# Patient Record
Sex: MALE | Race: OTHER
[De-identification: names, ages, dates, MRNs, and addresses within clinical notes are randomized per-mention and may not be internally consistent; named-entity substitution may affect disease eponyms.]

---

## 2018-04-20 ENCOUNTER — HOSPITAL ENCOUNTER (OUTPATIENT)
Dept: HOSPITAL 89 - RAD | Age: 18
End: 2018-04-20
Attending: FAMILY MEDICINE
Payer: MEDICAID

## 2018-04-20 DIAGNOSIS — I69.991: Primary | ICD-10-CM

## 2018-04-20 PROCEDURE — 74230 X-RAY XM SWLNG FUNCJ C+: CPT
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
## 2018-04-25 ENCOUNTER — HOSPITAL ENCOUNTER (OUTPATIENT)
Dept: HOSPITAL 89 - ZZLCC | Age: 18
End: 2018-04-25
Attending: FAMILY MEDICINE
Payer: MEDICAID

## 2018-04-25 DIAGNOSIS — Z02.9: Primary | ICD-10-CM

## 2018-04-25 PROCEDURE — 82040 ASSAY OF SERUM ALBUMIN: CPT

## 2018-04-25 PROCEDURE — 84100 ASSAY OF PHOSPHORUS: CPT

## 2018-04-25 PROCEDURE — 83735 ASSAY OF MAGNESIUM: CPT

## 2018-06-12 ENCOUNTER — HOSPITAL ENCOUNTER (OUTPATIENT)
Dept: HOSPITAL 89 - ZZLCC | Age: 18
End: 2018-06-12
Attending: FAMILY MEDICINE
Payer: MEDICAID

## 2018-06-12 DIAGNOSIS — R11.10: Primary | ICD-10-CM

## 2018-06-12 LAB — PLATELET COUNT, AUTOMATED: 127 K/UL (ref 150–450)

## 2018-06-12 PROCEDURE — 85027 COMPLETE CBC AUTOMATED: CPT

## 2018-06-12 PROCEDURE — 82374 ASSAY BLOOD CARBON DIOXIDE: CPT

## 2018-06-12 PROCEDURE — 84520 ASSAY OF UREA NITROGEN: CPT

## 2018-06-12 PROCEDURE — 84295 ASSAY OF SERUM SODIUM: CPT

## 2018-06-12 PROCEDURE — 82565 ASSAY OF CREATININE: CPT

## 2018-06-12 PROCEDURE — 84132 ASSAY OF SERUM POTASSIUM: CPT

## 2018-06-12 PROCEDURE — 82310 ASSAY OF CALCIUM: CPT

## 2018-06-12 PROCEDURE — 82947 ASSAY GLUCOSE BLOOD QUANT: CPT

## 2018-06-12 PROCEDURE — 82435 ASSAY OF BLOOD CHLORIDE: CPT

## 2018-07-28 ENCOUNTER — HOSPITAL ENCOUNTER (EMERGENCY)
Dept: HOSPITAL 89 - ER | Age: 18
Discharge: HOME | End: 2018-07-28
Payer: MEDICAID

## 2018-07-28 ENCOUNTER — HOSPITAL ENCOUNTER (OUTPATIENT)
Dept: HOSPITAL 89 - AMB | Age: 18
End: 2018-07-28
Payer: MEDICAID

## 2018-07-28 VITALS — DIASTOLIC BLOOD PRESSURE: 54 MMHG | SYSTOLIC BLOOD PRESSURE: 98 MMHG

## 2018-07-28 DIAGNOSIS — Z93.1: Primary | ICD-10-CM

## 2018-07-28 DIAGNOSIS — R63.3: ICD-10-CM

## 2018-07-28 DIAGNOSIS — Z74.01: ICD-10-CM

## 2018-07-28 DIAGNOSIS — K94.23: Primary | ICD-10-CM

## 2018-07-28 PROCEDURE — 99283 EMERGENCY DEPT VISIT LOW MDM: CPT

## 2018-07-28 NOTE — ER REPORT
History and Physical


Time Seen By MD:  23:04


HPI/ROS


CHIEF COMPLAINT: feeding tube not working





HISTORY OF PRESENT ILLNESS: This is a 17 year old male. He had his Keppra given 

through the feeding tube, but  now not working. Sent by ambulance for further 

evaluation.


Home Meds


Reported Medications


Sertraline Hcl (SERTRALINE HCL) 25 Mg Tablet, 1 TAB FT QDAY, TAB


   7/29/18


Quetiapine Fumarate (QUETIAPINE FUMARATE) 25 Mg Tablet, 12.5 MG FT QDAY


   7/29/18


Quetiapine Fumarate (Quetiapine Fumarate ER) 50 Mg Tab.er.24h, 12.2 MG


   7/29/18


Potassium Chloride (POTASSIUM CHLORIDE) 20 Meq Packet, 20 MEQ FT QDAY, PACKET


   7/29/18


Oxycodone/Acetaminophen (OXYCODONE/ACETAMINOPHEN 5MG/325 MG) 5 Mg/325 Mg Tab, 1 

TAB FT Q8H


   7/29/18


Eyelid Cleanser Combination #5 (OCUSOFT LID SCRUB) 1 Each Med..pad, 1 EACH TP 

BID


   7/29/18


Hyoscyamine Sulfate (LEVSIN) 0.125 Mg Tablet, 0.125 MG FT BID


   7/29/18


Clonazepam (KLONOPIN) 1 Mg Tablet, 1 MG FT BID, #7 TAB


   7/29/18


Glycopyrrolate (GLYCOPYRROLATE) 2 Mg Tablet, 2 MG FT TID


   7/29/18


Levetiracetam (LEVETIRACETAM) 500 Mg Tablet, 500 MG FT BID


   7/28/18


Reviewed Nurses Notes:  Yes


Constitutional





Vital Sign - Last 24 Hours








 7/28/18





 23:06


 


Temp 99.1


 


Pulse 76


 


Resp 14


 


B/P (MAP) 98/54


 


Pulse Ox 95








Physical Exam


Abdomen is soft, non-tender. Feeding tube site normal in appearance. Feeding 

tube flushes easily with some CocaCola and then flushed with sterile water. No 

problems noted.





Medical Decision Making


ED Course/Re-evaluation


ED Course


feeding tube flushes normally. Okay to discharge back to care center.


Decision to Disposition Date:  Jul 28, 2018


Decision to Disposition Time:  23:10





Depart


Departure


Latest Vital Signs





Vital Signs








  Date Time  Temp Pulse Resp B/P (MAP) Pulse Ox O2 Delivery O2 Flow Rate FiO2


 


7/28/18 23:06 99.1 76 14 98/54 95   








Impression:  


 Primary Impression:  


 Feeding tube dysfunction


Condition:  Improved


Disposition:  HOME OR SELF-CARE





Additional Instructions:  


No changes at this time.





Problem Qualifiers








 Primary Impression:  


 Feeding tube dysfunction


 Encounter type:  initial encounter  Qualified Codes:  T85.598A - Other 

mechanical complication of other gastrointestinal prosthetic devices, implants 

and grafts, initial encounter








JACK THOMPSON MD Jul 28, 2018 23:05

## 2018-08-01 ENCOUNTER — HOSPITAL ENCOUNTER (OUTPATIENT)
Dept: HOSPITAL 89 - ZZLCC | Age: 18
End: 2018-08-01
Attending: FAMILY MEDICINE
Payer: MEDICAID

## 2018-08-01 DIAGNOSIS — S06.9X9D: Primary | ICD-10-CM

## 2018-08-01 DIAGNOSIS — R53.1: ICD-10-CM

## 2018-08-01 DIAGNOSIS — I63.9: ICD-10-CM

## 2018-08-01 LAB — PLATELET COUNT, AUTOMATED: 117 K/UL (ref 150–450)

## 2018-08-01 PROCEDURE — 82947 ASSAY GLUCOSE BLOOD QUANT: CPT

## 2018-08-01 PROCEDURE — 84460 ALANINE AMINO (ALT) (SGPT): CPT

## 2018-08-01 PROCEDURE — 84450 TRANSFERASE (AST) (SGOT): CPT

## 2018-08-01 PROCEDURE — 84075 ASSAY ALKALINE PHOSPHATASE: CPT

## 2018-08-01 PROCEDURE — 84132 ASSAY OF SERUM POTASSIUM: CPT

## 2018-08-01 PROCEDURE — 82040 ASSAY OF SERUM ALBUMIN: CPT

## 2018-08-01 PROCEDURE — 82435 ASSAY OF BLOOD CHLORIDE: CPT

## 2018-08-01 PROCEDURE — 84155 ASSAY OF PROTEIN SERUM: CPT

## 2018-08-01 PROCEDURE — 84295 ASSAY OF SERUM SODIUM: CPT

## 2018-08-01 PROCEDURE — 82310 ASSAY OF CALCIUM: CPT

## 2018-08-01 PROCEDURE — 82374 ASSAY BLOOD CARBON DIOXIDE: CPT

## 2018-08-01 PROCEDURE — 82565 ASSAY OF CREATININE: CPT

## 2018-08-01 PROCEDURE — 84520 ASSAY OF UREA NITROGEN: CPT

## 2018-08-01 PROCEDURE — 85027 COMPLETE CBC AUTOMATED: CPT

## 2018-08-01 PROCEDURE — 82247 BILIRUBIN TOTAL: CPT

## 2018-08-08 ENCOUNTER — HOSPITAL ENCOUNTER (EMERGENCY)
Dept: HOSPITAL 89 - ER | Age: 18
Discharge: HOME | End: 2018-08-08
Payer: MEDICAID

## 2018-08-08 ENCOUNTER — HOSPITAL ENCOUNTER (OUTPATIENT)
Dept: HOSPITAL 89 - AMB | Age: 18
End: 2018-08-08
Payer: MEDICAID

## 2018-08-08 VITALS — DIASTOLIC BLOOD PRESSURE: 42 MMHG | SYSTOLIC BLOOD PRESSURE: 89 MMHG

## 2018-08-08 DIAGNOSIS — R11.10: ICD-10-CM
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
## 2018-08-08 DIAGNOSIS — K94.23: Primary | ICD-10-CM

## 2018-08-08 DIAGNOSIS — Z74.01: ICD-10-CM

## 2018-08-08 DIAGNOSIS — R50.9: Primary | ICD-10-CM
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
## 2018-08-08 DIAGNOSIS — R53.1: Primary | ICD-10-CM

## 2018-08-08 DIAGNOSIS — S06.309A: ICD-10-CM

## 2018-08-08 LAB
INR PPP: 1.05
PLATELET COUNT, AUTOMATED: 118 K/UL (ref 150–450)

## 2018-08-08 PROCEDURE — 84132 ASSAY OF SERUM POTASSIUM: CPT

## 2018-08-08 PROCEDURE — 85025 COMPLETE CBC W/AUTO DIFF WBC: CPT

## 2018-08-08 PROCEDURE — 74340 X-RAY GUIDE FOR GI TUBE: CPT

## 2018-08-08 PROCEDURE — 96374 THER/PROPH/DIAG INJ IV PUSH: CPT

## 2018-08-08 PROCEDURE — 84460 ALANINE AMINO (ALT) (SGPT): CPT

## 2018-08-08 PROCEDURE — 82565 ASSAY OF CREATININE: CPT

## 2018-08-08 PROCEDURE — 85730 THROMBOPLASTIN TIME PARTIAL: CPT

## 2018-08-08 PROCEDURE — 99284 EMERGENCY DEPT VISIT MOD MDM: CPT

## 2018-08-08 PROCEDURE — 96361 HYDRATE IV INFUSION ADD-ON: CPT

## 2018-08-08 PROCEDURE — 82247 BILIRUBIN TOTAL: CPT

## 2018-08-08 PROCEDURE — 84075 ASSAY ALKALINE PHOSPHATASE: CPT

## 2018-08-08 PROCEDURE — 49465 FLUORO EXAM OF G/COLON TUBE: CPT

## 2018-08-08 PROCEDURE — 85610 PROTHROMBIN TIME: CPT

## 2018-08-08 PROCEDURE — 82947 ASSAY GLUCOSE BLOOD QUANT: CPT

## 2018-08-08 PROCEDURE — 82310 ASSAY OF CALCIUM: CPT

## 2018-08-08 PROCEDURE — 84155 ASSAY OF PROTEIN SERUM: CPT

## 2018-08-08 PROCEDURE — 82374 ASSAY BLOOD CARBON DIOXIDE: CPT

## 2018-08-08 PROCEDURE — 84520 ASSAY OF UREA NITROGEN: CPT

## 2018-08-08 PROCEDURE — 82040 ASSAY OF SERUM ALBUMIN: CPT

## 2018-08-08 PROCEDURE — 82435 ASSAY OF BLOOD CHLORIDE: CPT

## 2018-08-08 PROCEDURE — 84450 TRANSFERASE (AST) (SGOT): CPT

## 2018-08-08 PROCEDURE — 84295 ASSAY OF SERUM SODIUM: CPT

## 2018-08-08 PROCEDURE — 83690 ASSAY OF LIPASE: CPT
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
## 2018-08-08 NOTE — RADIOLOGY IMAGING REPORT
FACILITY: [facility] 

 

PATIENT NAME: Zelalem Salinas

: 2000

MR: [id number]

V: [id number]

EXAM DATE: 

ORDERING PHYSICIAN: GELY ALEXIS

TECHNOLOGIST: 

 

Location: [facility] [place]

Patient: Zelalem Salinas

: 2000

MRN: [id number]

Visit/Account:[id number]

Date of Sevice:  2018

 

ACCESSION #: [id number]

 

Exam type: FLUORO NG TUBE PLACEMENT

 

History: Gastrografin through G-tube for confirmation of placement

 

Comparison: None.

 

Findings:

 

60 mL of a Gastrografin suspension was instilled through the patient's gastrostomy tube gastrostomy t
ube with appears to be along the greater curvature of the gastric body.  The Gastrografin entered the
 stomach and passed into the duodenum without evidence of obstruction.  The balloon from the G-tube a
ppears to be tethering the greater curvature inferiorly.  The fluoroscopy dose area product was 147.7
2 micro-Gray per meter squared

 

IMPRESSION:

 

1.  The G-tube appears to be located along the greater curvature the stomach which is tethering the g
reater curvature inferiorly although there is no extraluminal extravasation of the Gastrografin which
 flowed freely into the stomach and proximal duodenum

 

Report Dictated By: Hanh Love MD at 2018 9:40 AM

 

Report E-Signed By: Hanh Love MD  at 2018 9:44 AM

 

WSN:[id number]

## 2018-08-29 ENCOUNTER — HOSPITAL ENCOUNTER (OUTPATIENT)
Dept: HOSPITAL 89 - AMB | Age: 18
End: 2018-08-29
Payer: MEDICAID

## 2018-08-29 ENCOUNTER — HOSPITAL ENCOUNTER (INPATIENT)
Dept: HOSPITAL 89 - ER | Age: 18
LOS: 3 days | Discharge: NURSING, REHAB OR LONG-TERM CARE | DRG: 177 | End: 2018-09-01
Attending: INTERNAL MEDICINE | Admitting: INTERNAL MEDICINE
Payer: MEDICAID

## 2018-08-29 VITALS — SYSTOLIC BLOOD PRESSURE: 101 MMHG | DIASTOLIC BLOOD PRESSURE: 55 MMHG

## 2018-08-29 VITALS — SYSTOLIC BLOOD PRESSURE: 95 MMHG | DIASTOLIC BLOOD PRESSURE: 52 MMHG

## 2018-08-29 VITALS — SYSTOLIC BLOOD PRESSURE: 97 MMHG | DIASTOLIC BLOOD PRESSURE: 67 MMHG

## 2018-08-29 DIAGNOSIS — R41.0: ICD-10-CM

## 2018-08-29 DIAGNOSIS — Z99.3: ICD-10-CM

## 2018-08-29 DIAGNOSIS — R11.10: Primary | ICD-10-CM

## 2018-08-29 DIAGNOSIS — G82.50: ICD-10-CM

## 2018-08-29 DIAGNOSIS — Z87.820: ICD-10-CM

## 2018-08-29 DIAGNOSIS — R09.02: ICD-10-CM

## 2018-08-29 DIAGNOSIS — K94.23: ICD-10-CM

## 2018-08-29 DIAGNOSIS — R00.0: ICD-10-CM

## 2018-08-29 DIAGNOSIS — J69.0: Primary | ICD-10-CM

## 2018-08-29 DIAGNOSIS — I95.9: ICD-10-CM

## 2018-08-29 LAB — PLATELET COUNT, AUTOMATED: 156 K/UL (ref 150–450)

## 2018-08-29 PROCEDURE — 82247 BILIRUBIN TOTAL: CPT

## 2018-08-29 PROCEDURE — 84460 ALANINE AMINO (ALT) (SGPT): CPT

## 2018-08-29 PROCEDURE — 71250 CT THORAX DX C-: CPT

## 2018-08-29 PROCEDURE — 82310 ASSAY OF CALCIUM: CPT

## 2018-08-29 PROCEDURE — 84520 ASSAY OF UREA NITROGEN: CPT

## 2018-08-29 PROCEDURE — 84295 ASSAY OF SERUM SODIUM: CPT

## 2018-08-29 PROCEDURE — 82565 ASSAY OF CREATININE: CPT

## 2018-08-29 PROCEDURE — 87040 BLOOD CULTURE FOR BACTERIA: CPT

## 2018-08-29 PROCEDURE — 74176 CT ABD & PELVIS W/O CONTRAST: CPT

## 2018-08-29 PROCEDURE — 82374 ASSAY BLOOD CARBON DIOXIDE: CPT

## 2018-08-29 PROCEDURE — 84155 ASSAY OF PROTEIN SERUM: CPT

## 2018-08-29 PROCEDURE — 99284 EMERGENCY DEPT VISIT MOD MDM: CPT

## 2018-08-29 PROCEDURE — 82947 ASSAY GLUCOSE BLOOD QUANT: CPT

## 2018-08-29 PROCEDURE — 82040 ASSAY OF SERUM ALBUMIN: CPT

## 2018-08-29 PROCEDURE — 36415 COLL VENOUS BLD VENIPUNCTURE: CPT

## 2018-08-29 PROCEDURE — 84075 ASSAY ALKALINE PHOSPHATASE: CPT

## 2018-08-29 PROCEDURE — 43760: CPT

## 2018-08-29 PROCEDURE — 96361 HYDRATE IV INFUSION ADD-ON: CPT

## 2018-08-29 PROCEDURE — 84132 ASSAY OF SERUM POTASSIUM: CPT

## 2018-08-29 PROCEDURE — 96365 THER/PROPH/DIAG IV INF INIT: CPT

## 2018-08-29 PROCEDURE — 85025 COMPLETE CBC W/AUTO DIFF WBC: CPT

## 2018-08-29 PROCEDURE — 84450 TRANSFERASE (AST) (SGOT): CPT

## 2018-08-29 PROCEDURE — 0D20XUZ CHANGE FEEDING DEVICE IN UPPER INTESTINAL TRACT, EXTERNAL APPROACH: ICD-10-PCS | Performed by: SURGERY

## 2018-08-29 PROCEDURE — 82435 ASSAY OF BLOOD CHLORIDE: CPT

## 2018-08-29 RX ADMIN — AMPICILLIN SODIUM AND SULBACTAM SODIUM SCH MLS/HR: 2; 1 INJECTION, POWDER, FOR SOLUTION INTRAVENOUS at 20:18

## 2018-08-29 RX ADMIN — LEVETIRACETAM SCH MLS/HR: 100 INJECTION, SOLUTION INTRAVENOUS at 21:29

## 2018-08-29 RX ADMIN — AMPICILLIN SODIUM AND SULBACTAM SODIUM SCH MLS/HR: 2; 1 INJECTION, POWDER, FOR SOLUTION INTRAVENOUS at 14:02

## 2018-08-29 RX ADMIN — THIAMINE HYDROCHLORIDE PRN MLS/HR: 100 INJECTION, SOLUTION INTRAMUSCULAR; INTRAVENOUS at 23:54

## 2018-08-29 NOTE — RADIOLOGY IMAGING REPORT
FACILITY: [facility] [place] 

 

PATIENT NAME: Zelalem Salinas

: 2000

MR: [id number]

V: [id number]

EXAM DATE: [id number]

ORDERING PHYSICIAN: GELY ALEXIS

TECHNOLOGIST: 

 

[place] [facility] [place]

Patient: Zelalem Salinas

: 2000

MRN: [id number]

Visit/Account:[id number]

Date of Sevice:  2018

 

ACCESSION #: [id number]

 

ABDOMEN/PELVIS W/O CONTRAST

 

HISTORY:  Abdominal pain.  Evaluate for gastric outlet obstruction

 

TECHNIQUE:  Axial images were obtained through the abdomen and pelvis without intravenous contrast . 
 Dilute Gastrografin administered prior to the exam. One of the following dose optimization technique
s was utilized in the performance of this exam: automated exposure control; adjustment of the mA and/
or kv according to patient size; or use of iterative reconstruction technique. Specific details can b
e referenced in the facility's radiology CT exam operational policy.

 

CONTRAST:  None

 

COMPARISON:  None.

 

FINDINGS:

 

Visualized lung bases:  Bilateral perihilar groundglass-reticular opacities.

Hepatobiliary:  Negative.

Spleen:  Negative.

Adrenals:  Negative.

Pancreas:  Negative.

Kidneys/ureters/bladder:  Negative.

Bowel/peritoneum/mesentery:  Percutaneous gastrostomy tube.  Dilute Gastrografin fills the stomach wi
th a small amount of contrast seen within the duodenum and proximal jejunum.  No evidence of small travis
wel obstruction.  No free air.  No contrast extravasation.  Large amount of stool within the rectosig
moid colon with a rectal stool ball measuring 9.7 x 9.1 x 8.8 cm.

Vessels:  Negative.

Lymph nodes: Negative.

Pelvic genitourinary: Negative.

Bones/body wall:  Negative.

Other findings: None significant

 

IMPRESSION:

 

1.  Dilute Gastrografin within the stomach with a small amount of contrast seen within duodenum and j
ejunum.  No free air or contrast extravasation.

2.  Large amount of stool within the rectosigmoid colon without obstructive features.

3.  Moderate bilateral perihilar groundglass-reticular opacities which may be infectious/inflammatory
 or aspiration.

 

Report Dictated By: Joel Camacho MD at 2018 10:44 AM

 

Report E-Signed By: Joel Camacho MD  at 2018 10:51 AM

 

WSN:[id number]
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
## 2018-08-29 NOTE — RADIOLOGY IMAGING REPORT
FACILITY: [facility] [place] 

 

PATIENT NAME: Zelalem Salinas

: 2000

MR: [id number]

V: [id number]

EXAM DATE: 

ORDERING PHYSICIAN: GELY ALEXIS

TECHNOLOGIST: 

 

Location: [facility] [place]

Patient: Zelalem Salinas

: 2000

MRN: [id number]

Visit/Account:[id number]

Date of Sevice:  2018

 

ACCESSION #: [id number]

 

CHEST W/O CONTRAST

 

HISTORY:  aspiration

 

TECHNIQUE:  CT chest without intravenous contrast.

 

One of the following dose optimization techniques was utilized in the performance of this exam: Autom
ated exposure control; adjustment of the mA and/or kV according to the patient's size; or use of an i
terative  reconstruction technique.  Specific details can be referenced in the facility's radiology C
T exam operational policy.

 

CONTRAST:  None.

 

COMPARISON:  None.

 

FINDINGS:

 

Heart/vessels:  Note is made of apparent common carotid bypass grafts which are not well assessed wit
hout IV contrast. Otherwise negative.

 

Mediastinum:  Negative.

 

Lymph nodes:  Borderline prominent nonspecific right paratracheal lymph node measuring up to 9 mm in 
short axis. No pathologically enlarged.

 

Lungs/pleura:  Extensive nodular groundglass opacities throughout the lungs, right greater than left.
 There is a small right tracheal diverticulum.. Cannot accurately assess for small pulmonary micronod
ules secondary to respiratory motion artifact.

 

Visualized upper abdomen:  Negative.

 

Bones/soft tissues:  Negative.

 

IMPRESSION:

 

1. Extensive nodular groundglass opacities throughout the lungs, right greater than left, likely infe
ctious/inflammatory in etiology.

2. Additional incidental/chronic findings, as above.

 

Report Dictated By: Kimo Chance MD at 2018 11:31 AM

 

Report E-Signed By: Kimo Chance MD  at 2018 11:35 AM

 

WSN:[id number]
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
## 2018-08-30 VITALS — DIASTOLIC BLOOD PRESSURE: 52 MMHG | SYSTOLIC BLOOD PRESSURE: 111 MMHG

## 2018-08-30 VITALS — DIASTOLIC BLOOD PRESSURE: 45 MMHG | SYSTOLIC BLOOD PRESSURE: 83 MMHG

## 2018-08-30 VITALS — DIASTOLIC BLOOD PRESSURE: 66 MMHG | SYSTOLIC BLOOD PRESSURE: 103 MMHG

## 2018-08-30 VITALS — SYSTOLIC BLOOD PRESSURE: 97 MMHG | DIASTOLIC BLOOD PRESSURE: 53 MMHG

## 2018-08-30 LAB — PLATELET COUNT, AUTOMATED: 104 K/UL (ref 150–450)

## 2018-08-30 RX ADMIN — HYOSCYAMINE SULFATE SCH MG: 0.12 SOLUTION/ DROPS ORAL at 21:24

## 2018-08-30 RX ADMIN — AMPICILLIN SODIUM AND SULBACTAM SODIUM SCH MLS/HR: 2; 1 INJECTION, POWDER, FOR SOLUTION INTRAVENOUS at 02:21

## 2018-08-30 RX ADMIN — SERTRALINE HYDROCHLORIDE SCH MG: 50 TABLET, FILM COATED ORAL at 12:53

## 2018-08-30 RX ADMIN — BACLOFEN SCH MG: 10 TABLET ORAL at 21:23

## 2018-08-30 RX ADMIN — BACLOFEN SCH MG: 10 TABLET ORAL at 16:34

## 2018-08-30 RX ADMIN — POLYETHYLENE GLYCOL 3350 SCH GM: 17 POWDER, FOR SOLUTION ORAL at 12:53

## 2018-08-30 RX ADMIN — BACLOFEN SCH MG: 10 TABLET ORAL at 12:53

## 2018-08-30 RX ADMIN — AMPICILLIN SODIUM AND SULBACTAM SODIUM SCH MLS/HR: 2; 1 INJECTION, POWDER, FOR SOLUTION INTRAVENOUS at 21:21

## 2018-08-30 RX ADMIN — THIAMINE HYDROCHLORIDE PRN MLS/HR: 100 INJECTION, SOLUTION INTRAMUSCULAR; INTRAVENOUS at 16:38

## 2018-08-30 RX ADMIN — AMPICILLIN SODIUM AND SULBACTAM SODIUM SCH MLS/HR: 2; 1 INJECTION, POWDER, FOR SOLUTION INTRAVENOUS at 08:04

## 2018-08-30 RX ADMIN — LEVETIRACETAM SCH MLS/HR: 100 INJECTION, SOLUTION INTRAVENOUS at 09:25

## 2018-08-30 RX ADMIN — HYOSCYAMINE SULFATE SCH MG: 0.12 SOLUTION/ DROPS ORAL at 12:55

## 2018-08-30 RX ADMIN — THIAMINE HYDROCHLORIDE PRN MLS/HR: 100 INJECTION, SOLUTION INTRAMUSCULAR; INTRAVENOUS at 10:53

## 2018-08-30 RX ADMIN — AMPICILLIN SODIUM AND SULBACTAM SODIUM SCH MLS/HR: 2; 1 INJECTION, POWDER, FOR SOLUTION INTRAVENOUS at 14:12

## 2018-08-30 RX ADMIN — QUETIAPINE FUMARATE SCH MG: 25 TABLET, FILM COATED ORAL at 21:23
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
## 2018-08-31 VITALS — DIASTOLIC BLOOD PRESSURE: 52 MMHG | SYSTOLIC BLOOD PRESSURE: 85 MMHG

## 2018-08-31 VITALS — DIASTOLIC BLOOD PRESSURE: 72 MMHG | SYSTOLIC BLOOD PRESSURE: 106 MMHG

## 2018-08-31 VITALS — SYSTOLIC BLOOD PRESSURE: 91 MMHG | DIASTOLIC BLOOD PRESSURE: 50 MMHG

## 2018-08-31 VITALS — DIASTOLIC BLOOD PRESSURE: 78 MMHG | SYSTOLIC BLOOD PRESSURE: 110 MMHG

## 2018-08-31 RX ADMIN — BACLOFEN SCH MG: 10 TABLET ORAL at 13:33

## 2018-08-31 RX ADMIN — AMPICILLIN SODIUM AND SULBACTAM SODIUM SCH MLS/HR: 2; 1 INJECTION, POWDER, FOR SOLUTION INTRAVENOUS at 08:26

## 2018-08-31 RX ADMIN — SERTRALINE HYDROCHLORIDE SCH MG: 50 TABLET, FILM COATED ORAL at 09:25

## 2018-08-31 RX ADMIN — AMPICILLIN SODIUM AND SULBACTAM SODIUM SCH MLS/HR: 2; 1 INJECTION, POWDER, FOR SOLUTION INTRAVENOUS at 01:43

## 2018-08-31 RX ADMIN — QUETIAPINE FUMARATE SCH MG: 25 TABLET, FILM COATED ORAL at 20:40

## 2018-08-31 RX ADMIN — BACLOFEN SCH MG: 10 TABLET ORAL at 09:25

## 2018-08-31 RX ADMIN — ENOXAPARIN SODIUM SCH MG: 100 INJECTION SUBCUTANEOUS at 14:13

## 2018-08-31 RX ADMIN — HYOSCYAMINE SULFATE SCH MG: 0.12 SOLUTION/ DROPS ORAL at 09:26

## 2018-08-31 RX ADMIN — THIAMINE HYDROCHLORIDE PRN MLS/HR: 100 INJECTION, SOLUTION INTRAMUSCULAR; INTRAVENOUS at 01:25

## 2018-08-31 RX ADMIN — AMPICILLIN SODIUM AND SULBACTAM SODIUM SCH MLS/HR: 2; 1 INJECTION, POWDER, FOR SOLUTION INTRAVENOUS at 20:39

## 2018-08-31 RX ADMIN — MORPHINE SULFATE PRN MG: 2 INJECTION, SOLUTION INTRAMUSCULAR; INTRAVENOUS at 22:51

## 2018-08-31 RX ADMIN — MORPHINE SULFATE PRN MG: 2 INJECTION, SOLUTION INTRAMUSCULAR; INTRAVENOUS at 15:54

## 2018-08-31 RX ADMIN — THIAMINE HYDROCHLORIDE PRN MLS/HR: 100 INJECTION, SOLUTION INTRAMUSCULAR; INTRAVENOUS at 10:54

## 2018-08-31 RX ADMIN — BACLOFEN SCH MG: 10 TABLET ORAL at 20:39

## 2018-08-31 RX ADMIN — POLYETHYLENE GLYCOL 3350 SCH GM: 17 POWDER, FOR SOLUTION ORAL at 09:26

## 2018-08-31 RX ADMIN — MORPHINE SULFATE PRN MG: 2 INJECTION, SOLUTION INTRAMUSCULAR; INTRAVENOUS at 18:07

## 2018-08-31 RX ADMIN — AMPICILLIN SODIUM AND SULBACTAM SODIUM SCH MLS/HR: 2; 1 INJECTION, POWDER, FOR SOLUTION INTRAVENOUS at 13:34

## 2018-08-31 RX ADMIN — HYOSCYAMINE SULFATE SCH MG: 0.12 SOLUTION/ DROPS ORAL at 20:40

## 2018-08-31 RX ADMIN — BACLOFEN SCH MG: 10 TABLET ORAL at 17:35

## 2018-08-31 RX ADMIN — ACETAMINOPHEN PRN MG: 160 SOLUTION ORAL at 14:24
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
## 2018-08-31 NOTE — MISCELLANEOUS PROVIDER NOTE
Miscellaneous Provider Note


Note


Patient's feeding tube is occluded and cannot be used for feedings or even 


flushed. I swapped it out and placed a 20 [place] balloon tip feeding tube. He 


tolerated without problems. Tube feedings can be resumed at hospitalist 


discretion.











ULLRICH,JOHN A MD              Aug 31, 2018 09:03

## 2018-09-01 VITALS — DIASTOLIC BLOOD PRESSURE: 86 MMHG | SYSTOLIC BLOOD PRESSURE: 119 MMHG

## 2018-09-01 LAB — PLATELET COUNT, AUTOMATED: 107 K/UL (ref 150–450)

## 2018-09-01 RX ADMIN — HYOSCYAMINE SULFATE SCH MG: 0.12 SOLUTION/ DROPS ORAL at 09:44

## 2018-09-01 RX ADMIN — MORPHINE SULFATE PRN MG: 2 INJECTION, SOLUTION INTRAMUSCULAR; INTRAVENOUS at 06:24

## 2018-09-01 RX ADMIN — ENOXAPARIN SODIUM SCH MG: 100 INJECTION SUBCUTANEOUS at 09:43

## 2018-09-01 RX ADMIN — AMPICILLIN SODIUM AND SULBACTAM SODIUM SCH MLS/HR: 2; 1 INJECTION, POWDER, FOR SOLUTION INTRAVENOUS at 02:51

## 2018-09-01 RX ADMIN — SERTRALINE HYDROCHLORIDE SCH MG: 50 TABLET, FILM COATED ORAL at 09:43

## 2018-09-01 RX ADMIN — ACETAMINOPHEN PRN MG: 160 SOLUTION ORAL at 07:46

## 2018-09-01 RX ADMIN — POLYETHYLENE GLYCOL 3350 SCH GM: 17 POWDER, FOR SOLUTION ORAL at 09:43

## 2018-09-01 RX ADMIN — AMPICILLIN SODIUM AND SULBACTAM SODIUM SCH MLS/HR: 2; 1 INJECTION, POWDER, FOR SOLUTION INTRAVENOUS at 07:46

## 2018-09-01 RX ADMIN — BACLOFEN SCH MG: 10 TABLET ORAL at 09:43

## 2018-09-01 RX ADMIN — ACETAMINOPHEN PRN MG: 160 SOLUTION ORAL at 00:14

## 2018-09-01 RX ADMIN — AMPICILLIN SODIUM AND SULBACTAM SODIUM SCH MLS/HR: 2; 1 INJECTION, POWDER, FOR SOLUTION INTRAVENOUS at 13:19

## 2018-09-01 RX ADMIN — BACLOFEN SCH MG: 10 TABLET ORAL at 12:47

## 2018-09-01 RX ADMIN — THIAMINE HYDROCHLORIDE PRN MLS/HR: 100 INJECTION, SOLUTION INTRAMUSCULAR; INTRAVENOUS at 02:53
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
## 2018-09-28 ENCOUNTER — HOSPITAL ENCOUNTER (INPATIENT)
Dept: HOSPITAL 89 - ER | Age: 18
LOS: 3 days | Discharge: NURSING, REHAB OR LONG-TERM CARE | DRG: 177 | End: 2018-10-01
Attending: INTERNAL MEDICINE | Admitting: INTERNAL MEDICINE
Payer: MEDICAID

## 2018-09-28 ENCOUNTER — HOSPITAL ENCOUNTER (OUTPATIENT)
Dept: HOSPITAL 89 - AMB | Age: 18
End: 2018-09-28
Payer: MEDICAID

## 2018-09-28 VITALS — DIASTOLIC BLOOD PRESSURE: 63 MMHG | SYSTOLIC BLOOD PRESSURE: 105 MMHG

## 2018-09-28 VITALS — SYSTOLIC BLOOD PRESSURE: 105 MMHG | DIASTOLIC BLOOD PRESSURE: 63 MMHG

## 2018-09-28 DIAGNOSIS — R11.12: Primary | ICD-10-CM

## 2018-09-28 DIAGNOSIS — R53.81: ICD-10-CM

## 2018-09-28 DIAGNOSIS — G82.20: ICD-10-CM

## 2018-09-28 DIAGNOSIS — N39.0: ICD-10-CM

## 2018-09-28 DIAGNOSIS — G82.50: ICD-10-CM

## 2018-09-28 DIAGNOSIS — Z74.01: ICD-10-CM

## 2018-09-28 DIAGNOSIS — K59.00: ICD-10-CM

## 2018-09-28 DIAGNOSIS — Z66: ICD-10-CM

## 2018-09-28 DIAGNOSIS — Z87.820: ICD-10-CM

## 2018-09-28 DIAGNOSIS — B95.2: ICD-10-CM

## 2018-09-28 DIAGNOSIS — J69.0: Primary | ICD-10-CM

## 2018-09-28 LAB — PLATELET COUNT, AUTOMATED: 116 K/UL (ref 150–450)

## 2018-09-28 PROCEDURE — 82374 ASSAY BLOOD CARBON DIOXIDE: CPT

## 2018-09-28 PROCEDURE — 82150 ASSAY OF AMYLASE: CPT

## 2018-09-28 PROCEDURE — 96365 THER/PROPH/DIAG IV INF INIT: CPT

## 2018-09-28 PROCEDURE — 71045 X-RAY EXAM CHEST 1 VIEW: CPT

## 2018-09-28 PROCEDURE — 87088 URINE BACTERIA CULTURE: CPT

## 2018-09-28 PROCEDURE — 84075 ASSAY ALKALINE PHOSPHATASE: CPT

## 2018-09-28 PROCEDURE — 84460 ALANINE AMINO (ALT) (SGPT): CPT

## 2018-09-28 PROCEDURE — 96361 HYDRATE IV INFUSION ADD-ON: CPT

## 2018-09-28 PROCEDURE — 84132 ASSAY OF SERUM POTASSIUM: CPT

## 2018-09-28 PROCEDURE — 84520 ASSAY OF UREA NITROGEN: CPT

## 2018-09-28 PROCEDURE — 82947 ASSAY GLUCOSE BLOOD QUANT: CPT

## 2018-09-28 PROCEDURE — 74177 CT ABD & PELVIS W/CONTRAST: CPT

## 2018-09-28 PROCEDURE — 87077 CULTURE AEROBIC IDENTIFY: CPT

## 2018-09-28 PROCEDURE — 82247 BILIRUBIN TOTAL: CPT

## 2018-09-28 PROCEDURE — 87186 SC STD MICRODIL/AGAR DIL: CPT

## 2018-09-28 PROCEDURE — 87040 BLOOD CULTURE FOR BACTERIA: CPT

## 2018-09-28 PROCEDURE — 82435 ASSAY OF BLOOD CHLORIDE: CPT

## 2018-09-28 PROCEDURE — 87502 INFLUENZA DNA AMP PROBE: CPT

## 2018-09-28 PROCEDURE — 82040 ASSAY OF SERUM ALBUMIN: CPT

## 2018-09-28 PROCEDURE — 82565 ASSAY OF CREATININE: CPT

## 2018-09-28 PROCEDURE — 85025 COMPLETE CBC W/AUTO DIFF WBC: CPT

## 2018-09-28 PROCEDURE — 81001 URINALYSIS AUTO W/SCOPE: CPT

## 2018-09-28 PROCEDURE — 36415 COLL VENOUS BLD VENIPUNCTURE: CPT

## 2018-09-28 PROCEDURE — 84155 ASSAY OF PROTEIN SERUM: CPT

## 2018-09-28 PROCEDURE — 84295 ASSAY OF SERUM SODIUM: CPT

## 2018-09-28 PROCEDURE — 83690 ASSAY OF LIPASE: CPT

## 2018-09-28 PROCEDURE — 99284 EMERGENCY DEPT VISIT MOD MDM: CPT

## 2018-09-28 PROCEDURE — 84450 TRANSFERASE (AST) (SGOT): CPT

## 2018-09-28 PROCEDURE — 82310 ASSAY OF CALCIUM: CPT

## 2018-09-28 NOTE — RADIOLOGY IMAGING REPORT
[place] [facility] [place] 

 

PATIENT NAME: Zelalem Salinas

: 2000

MR: [id number]

V: [id number]

EXAM DATE: 

ORDERING PHYSICIAN: DESIRE AYERS

TECHNOLOGIST: 

 

Location: [facility] - [place]

Patient: Zelalem Salinas

: 2000

MRN: [id number]

Visit/Account:[id number]

Date of Sevice:  2018

 

ACCESSION #: [id number]

 

EXAMINATION: Portable AP Chest

 

HISTORY: Vomiting. Nausea.

 

COMPARISON: CT chest 2018.

 

FINDINGS:

There are increased perihilar interstitial markings bilaterally with peribronchial thickening. There 
is some mild patchy airspace disease in the mid and lower lungs. Allowing for differences in modality
 this is likely improved from the prior chest CT. Mild patchy parenchymal opacities are also noted al
raji the imaged lower lungs on the abdominal CT performed today.

 

No confluent consolidation. No pleural effusion or pneumothorax.

 

Normal cardiomediastinal silhouette. Sternotomy. Surgical clips in the neck and upper chest.

 

No acute osseous findings.

 

IMPRESSION:

Interstitial prominence throughout both lungs. Mild patchy airspace opacity in the lower lungs may re
present multifocal pneumonitis.

 

Report Dictated By: Deacon Wells MD at 2018 8:50 PM

 

Report E-Signed By: Deacon Wells MD  at 2018 8:53 PM

 

WSN:[id number]

## 2018-09-28 NOTE — ER REPORT
History and Physical


Time Seen By MD:  17:43


Hx. of Stated Complaint:  


vomiting 12x today, feverish, pt is nonverbal (baseline) and paralyzed


HPI/ROS


CHIEF COMPLAINT: Vomiting 12, cough





HISTORY OF PRESENT ILLNESS: 18-year-old male patient persisted emergency room 


with complaint of vomiting 12, cough. Patient is a resident of [facility]. He had several bouts of vomiting this morning before lunch. He had about


2 hours after lunch where he did not have any vomiting. After that he did have 


several more bouts of vomiting. They did note that he did have a cough couple of


times. They state that due to the vomiting they tried to get hold of his primary


care provider, however they were unable to and referred him to the emergency 


room for further evaluation. I did give him some Reglan which seemed to help for


short. Time.





REVIEW OF SYSTEMS:


Respiratory: As noted above


Cardiovascular: No chest pain, no palpitations.


Gastrointestinal: As noted above


Musculoskeletal: No back pain.


Allergies:  


Coded Allergies:  


     No Known Drug Allergies (Unverified , 8/8/18)


Home Meds


Reported Medications


Metoclopramide Hcl (METOCLOPRAMIDE HCL) 5 Mg Tablet, 5 MG PO


   9/28/18


Ondansetron (ZOFRAN ODT) 4 Mg Tab.rapdis, 4 MG PO Q8H PRN for NAUSEA, TAB.SOL


   8/30/18


Baclofen (BACLOFEN) 20 Mg Tablet, 20 MG FT QID, #15 TAB


   8/29/18


Polyethylene Glycol 3350 (MIRALAX) 17 Gm Powd.pack, 17 GM FT DAILY, PKT


   8/29/18


Bisacodyl (BISACODYL) 10 Mg Supp.rect, 10 MG RC DAILY, SUPP.RECT


   8/29/18


Sertraline Hcl (SERTRALINE HCL) 25 Mg Tablet, 1 TAB FT QDAY, TAB


   7/29/18


Quetiapine Fumarate (QUETIAPINE FUMARATE) 25 Mg Tablet, 12.5 MG FT QDAY


   7/29/18


Potassium Chloride (POTASSIUM CHLORIDE) 20 Meq Packet, 20 MEQ FT QDAY, PACKET


   7/29/18


Oxycodone/Acetaminophen (OXYCODONE/ACETAMINOPHEN 5MG/325 MG) 5 Mg/325 Mg Tab, 1 


TAB FT Q8H


   7/29/18


Eyelid Cleanser Combination #5 (OCUSOFT LID SCRUB) 1 Each Med..pad, 1 EACH TP 


BID


   7/29/18


Hyoscyamine Sulfate (LEVSIN) 0.125 Mg Tablet, 0.125 MG FT BID


   7/29/18


Clonazepam (KLONOPIN) 1 Mg Tablet, 1 MG FT BID, #7 TAB


   7/29/18


Glycopyrrolate (GLYCOPYRROLATE) 2 Mg Tablet, 2 MG FT TID


   7/29/18


Levetiracetam (LEVETIRACETAM) 500 Mg Tablet, 500 MG FT BID


   7/28/18


Discontinued Reported Medications


Dantrolene Sodium (DANTROLENE SODIUM) 25 Mg Capsule, 25 MG PO BID, CAPSULE


   8/29/18


Discontinued Scripts


Amoxicillin/Pot Clav 875-125 Mg Tab (AUGMENTIN 875-125 TABLET) 1 Each Tablet, 1 


TAB FT Q12H, #6 TAB


   Prov:ANI LAURA DO         9/1/18


Past Medical/Surgical History


Patient has a past medical history of TBI, contractures, quadriplegia.


Patient has a surgical history of PEG tube.


Reviewed Nurses Notes:  Yes


Hx Smoking:  No


Hx Substance Use Disorder:  No


Hx Alcohol Use:  No


Constitutional





Vital Sign - Last 24 Hours








 9/28/18 9/28/18





 17:29 18:05


 


Temp 101.3 


 


Pulse 100 


 


Resp 20 


 


B/P (MAP) 107/64 


 


Pulse Ox 86 


 


O2 Delivery Room Air 


 


O2 Flow Rate  3.0








Physical Exam


  General Appearance: The patient is alert, has no immediate need for airway 


protection and no current signs of toxicity.


Respiratory: Chest is non tender, lungs are clear to auscultation.


Cardiac: regular rate and rhythm


Gastrointestinal: Abdomen is distended and non tender, no masses, bowel sounds 


normal.


Musculoskeletal:  Neck: Neck is supple and non tender.


   Extremities have full range of motion and are non tender.


Skin: No rashes or lesions.





DIFFERENTIAL DIAGNOSIS: After history and physical exam differential diagnosis 


was considered for nausea and vomiting including but not limited to 


gastroenteritis, gastritis, appendicitis, and medication side effect.





Medical Decision Making


Data Points


Result Diagram:  


9/28/18 1804                                                                    


           9/28/18 1804





Laboratory





Hematology








Test


 9/28/18


17:36 9/28/18


18:04 9/28/18


18:51


 


Influenza Virus Type A (PCR)


 Negative


(NEGATIVE) 


 





 


Influenza Virus Type B (PCR)


 Negative


(NEGATIVE) 


 





 


Red Blood Count


 


 6.00 M/uL


(4.00-5.60) 





 


Mean Corpuscular Volume


 


 88.3 fL


(80.0-96.0) 





 


Mean Corpuscular Hemoglobin


 


 30.1 pg


(26.0-33.0) 





 


Mean Corpuscular Hemoglobin


Concent 


 34.0 g/dL


(32.0-36.0) 





 


Red Cell Distribution Width


 


 14.0 %


(11.5-14.5) 





 


Mean Platelet Volume


 


 11.6 fL


(7.2-11.1) 





 


Neutrophils (%) (Auto)


 


 91.5 %


(39.4-72.5) 





 


Lymphocytes (%) (Auto)


 


 2.2 %


(17.6-49.6) 





 


Monocytes (%) (Auto)


 


 6.0 %


(4.1-12.4) 





 


Eosinophils (%) (Auto)


 


 0.2 %


(0.4-6.7) 





 


Basophils (%) (Auto)


 


 0.1 %


(0.3-1.4) 





 


Nucleated RBC Relative Count


(auto) 


 0.0 /100WBC 


 





 


Neutrophils # (Auto)


 


 15.9 K/uL


(2.0-7.4) 





 


Lymphocytes # (Auto)


 


 0.4 K/uL


(1.3-3.6) 





 


Monocytes # (Auto)


 


 1.0 K/uL


(0.3-1.0) 





 


Eosinophils # (Auto)


 


 0.0 K/uL


(0.0-0.5) 





 


Basophils # (Auto)


 


 0.0 K/uL


(0.0-0.1) 





 


Nucleated RBC Absolute Count


(auto) 


 0.00 K/uL 


 





 


Sodium Level


 


 139 mmol/L


(137-145) 





 


Potassium Level


 


 3.9 mmol/L


(3.5-5.0) 





 


Chloride Level


 


 96 mmol/L


() 





 


Carbon Dioxide Level


 


 27 mmol/L


(22-30) 





 


Blood Urea Nitrogen


 


 18 mg/dl


(9-21) 





 


Creatinine


 


 0.90 mg/dl


(0.66-1.25) 





 


Glomerular Filtration Rate


Calc 


 > 60.0 


 





 


Random Glucose


 


 140 mg/dl


() 





 


Calcium Level


 


 9.5 mg/dl


(8.4-10.2) 





 


Total Bilirubin


 


 1.1 mg/dl


(0.2-1.3) 





 


Aspartate Amino Transf


(AST/SGOT) 


 54 U/L (0-35) 


 





 


Alanine Aminotransferase


(ALT/SGPT) 


 142 U/L (0-56) 


 





 


Alkaline Phosphatase


 


 149 U/L


(0-126) 





 


Total Protein


 


 8.3 g/dl


(6.3-8.2) 





 


Albumin


 


 4.5 g/dl


(3.5-5.0) 





 


Amylase Level  73 U/L (0-110)  


 


Lipase


 


 19 U/L


() 





 


Urine Color   Priscilla 


 


Urine Clarity


 


 


 Slightly-cloudy





 


Urine pH


 


 


 6.0 pH


(4.8-9.5)


 


Urine Specific Gravity   1.023 


 


Urine Protein


 


 


 Negative mg/dL


(NEGATIVE)


 


Urine Glucose (UA)


 


 


 Negative mg/dL


(NEGATIVE)


 


Urine Ketones


 


 


 Negative mg/dL


(NEGATIVE)


 


Urine Blood


 


 


 Negative


(NEGATIVE)


 


Urine Nitrite


 


 


 Negative


(NEGATIVE)


 


Urine Bilirubin


 


 


 Negative


(NEGATIVE)


 


Urine Urobilinogen


 


 


 2.0 mg/dL


(0.2-1.9)


 


Urine Leukocyte Esterase


 


 


 Negative


(NEGATIVE)


 


Urine RBC


 


 


 <1 /HPF


(0-2/HPF)


 


Urine WBC


 


 


 3 /HPF


(0-5/HPF)


 


Urine Squamous Epithelial


Cells 


 


 None /LPF


(NONE-FEW)


 


Urine Bacteria


 


 


 Negative /HPF


(NONE-FEW)


 


Urine Mucus


 


 


 Few /HPF


(NONE-FEW)








Chemistry








Test


 9/28/18


17:36 9/28/18


18:04 9/28/18


18:51


 


Influenza Virus Type A (PCR)


 Negative


(NEGATIVE) 


 





 


Influenza Virus Type B (PCR)


 Negative


(NEGATIVE) 


 





 


White Blood Count


 


 17.3 k/uL


(4.5-11.0) 





 


Red Blood Count


 


 6.00 M/uL


(4.00-5.60) 





 


Hemoglobin


 


 18.0 g/dL


(14.0-18.0) 





 


Hematocrit


 


 53.0 %


(42.0-52.0) 





 


Mean Corpuscular Volume


 


 88.3 fL


(80.0-96.0) 





 


Mean Corpuscular Hemoglobin


 


 30.1 pg


(26.0-33.0) 





 


Mean Corpuscular Hemoglobin


Concent 


 34.0 g/dL


(32.0-36.0) 





 


Red Cell Distribution Width


 


 14.0 %


(11.5-14.5) 





 


Platelet Count


 


 116 K/uL


(150-450) 





 


Mean Platelet Volume


 


 11.6 fL


(7.2-11.1) 





 


Neutrophils (%) (Auto)


 


 91.5 %


(39.4-72.5) 





 


Lymphocytes (%) (Auto)


 


 2.2 %


(17.6-49.6) 





 


Monocytes (%) (Auto)


 


 6.0 %


(4.1-12.4) 





 


Eosinophils (%) (Auto)


 


 0.2 %


(0.4-6.7) 





 


Basophils (%) (Auto)


 


 0.1 %


(0.3-1.4) 





 


Nucleated RBC Relative Count


(auto) 


 0.0 /100WBC 


 





 


Neutrophils # (Auto)


 


 15.9 K/uL


(2.0-7.4) 





 


Lymphocytes # (Auto)


 


 0.4 K/uL


(1.3-3.6) 





 


Monocytes # (Auto)


 


 1.0 K/uL


(0.3-1.0) 





 


Eosinophils # (Auto)


 


 0.0 K/uL


(0.0-0.5) 





 


Basophils # (Auto)


 


 0.0 K/uL


(0.0-0.1) 





 


Nucleated RBC Absolute Count


(auto) 


 0.00 K/uL 


 





 


Glomerular Filtration Rate


Calc 


 > 60.0 


 





 


Calcium Level


 


 9.5 mg/dl


(8.4-10.2) 





 


Total Bilirubin


 


 1.1 mg/dl


(0.2-1.3) 





 


Aspartate Amino Transf


(AST/SGOT) 


 54 U/L (0-35) 


 





 


Alanine Aminotransferase


(ALT/SGPT) 


 142 U/L (0-56) 


 





 


Alkaline Phosphatase


 


 149 U/L


(0-126) 





 


Total Protein


 


 8.3 g/dl


(6.3-8.2) 





 


Albumin


 


 4.5 g/dl


(3.5-5.0) 





 


Amylase Level  73 U/L (0-110)  


 


Lipase


 


 19 U/L


() 





 


Urine Color   Priscilla 


 


Urine Clarity


 


 


 Slightly-cloudy





 


Urine pH


 


 


 6.0 pH


(4.8-9.5)


 


Urine Specific Gravity   1.023 


 


Urine Protein


 


 


 Negative mg/dL


(NEGATIVE)


 


Urine Glucose (UA)


 


 


 Negative mg/dL


(NEGATIVE)


 


Urine Ketones


 


 


 Negative mg/dL


(NEGATIVE)


 


Urine Blood


 


 


 Negative


(NEGATIVE)


 


Urine Nitrite


 


 


 Negative


(NEGATIVE)


 


Urine Bilirubin


 


 


 Negative


(NEGATIVE)


 


Urine Urobilinogen


 


 


 2.0 mg/dL


(0.2-1.9)


 


Urine Leukocyte Esterase


 


 


 Negative


(NEGATIVE)


 


Urine RBC


 


 


 <1 /HPF


(0-2/HPF)


 


Urine WBC


 


 


 3 /HPF


(0-5/HPF)


 


Urine Squamous Epithelial


Cells 


 


 None /LPF


(NONE-FEW)


 


Urine Bacteria


 


 


 Negative /HPF


(NONE-FEW)


 


Urine Mucus


 


 


 Few /HPF


(NONE-FEW)








Urinalysis








Test


 9/28/18


18:51


 


Urine Color Priscilla 


 


Urine Clarity


 Slightly-cloudy





 


Urine pH


 6.0 pH


(4.8-9.5)


 


Urine Specific Gravity 1.023 


 


Urine Protein


 Negative mg/dL


(NEGATIVE)


 


Urine Glucose (UA)


 Negative mg/dL


(NEGATIVE)


 


Urine Ketones


 Negative mg/dL


(NEGATIVE)


 


Urine Blood


 Negative


(NEGATIVE)


 


Urine Nitrite


 Negative


(NEGATIVE)


 


Urine Bilirubin


 Negative


(NEGATIVE)


 


Urine Urobilinogen


 2.0 mg/dL


(0.2-1.9)


 


Urine Leukocyte Esterase


 Negative


(NEGATIVE)


 


Urine RBC


 <1 /HPF


(0-2/HPF)


 


Urine WBC


 3 /HPF


(0-5/HPF)


 


Urine Squamous Epithelial


Cells None /LPF


(NONE-FEW)


 


Urine Bacteria


 Negative /HPF


(NONE-FEW)


 


Urine Mucus


 Few /HPF


(NONE-FEW)











EKG/Imaging


Imaging


EXAMINATION: Portable AP Chest


 


HISTORY: Vomiting. Nausea.


 


COMPARISON: CT chest 8/29/2018.


 


FINDINGS:


There are increased perihilar interstitial markings bilaterally with 


peribronchial thickening. There is some mild patchy airspace disease in the mid 


and lower lungs. Allowing for differences in modality this is likely improved 


from the prior chest CT. Mild patchy parenchymal opacities are also noted along 


the imaged lower lungs on the abdominal CT performed today.


 


No confluent consolidation. No pleural effusion or pneumothorax.


 


Normal cardiomediastinal silhouette. Sternotomy. Surgical clips in the neck and 


upper chest.


 


No acute osseous findings.


 


IMPRESSION:


Interstitial prominence throughout both lungs. Mild patchy airspace opacity in 


the lower lungs may represent multifocal pneumonitis.


 


Report Dictated By: Deacon Wells MD at 9/28/2018 8:50 PM


 


Report E-Signed By: Deacon Wells MD  at 9/28/2018 8:53 PM





EXAMINATION: CT abdomen and pelvis with IV contrast


 


HISTORY:  Vomiting.


 


TECHNIQUE:   Axial CT images of the abdomen and pelvis were obtained with IV 


contrast, with coronal and sagittal 2D reconstructed images.


One of the following dose optimization techniques was utilized in the per


formance of this exam: Automated exposure control; adjustment of the mA and/or 


kV according to the patient's size; or use of an iterative  reconstruction 


technique.  Specific details can be referenced in the facility's radiology CT 


exam operational policy.


 


Contrast:  75 mL of IV Isovue-370.


 


COMPARISON:  8/29/2018.


 


FINDINGS:


Image quality is partially degraded by patient motion artifact.


 


Liver:  Negative.


Gallbladder and bile ducts:  Negative.


Spleen:  Negative.


Pancreas:  Negative.


Adrenal glands:  Negative.


Kidneys:  Negative. No hydronephrosis or urinary calculi.


 


Bowel and peritoneum:  There is a large volume of stool in the sigmoid colon and


rectum, with a small volume of upstream colonic stool. The rectal vault is 


distended up to 9 cm in diameter. This appears similar to the prior exam. Small 


bowel loops are normal in caliber. No free fluid or free intraperitoneal air. 


Percutaneous gastrostomy tube in place. The visualized appendix is unremarkable.


 


Pelvic  structures:  The urinary bladder is decompressed, with a Johnson 


catheter in place.


Lymph node assessment:  Negative.


Vessels:  Negative.


 


Musculoskeletal:   Negative.


Body wall:  Negative.


Lung bases:  There are patchy airspace opacities in the imaged lower lungs, 


suspicious for multifocal pneumonitis. This could be due to aspiration. Similar 


pulmonary changes were present on the prior CT.


 


IMPRESSION:


1. Large volume of stool in the sigmoid colon and rectum suggesting 


constipation. The rectal vault is distended up to 9 cm. Small bowel loops are 


normal in caliber.


2. Percutaneous gastrostomy tube and Johnson catheter in place.


3. No other acute intra-abdominal findings.


4. There is patchy airspace disease in the imaged lower lungs, suspicious for 


multifocal pneumonitis. This could be due to aspiration.


 


 


Report Dictated By: Deacon Wells MD at 9/28/2018 8:37 PM


 


Report E-Signed By: Deacon Wells MD  at 9/28/2018 8:42 PM





ED Course/Re-evaluation


ED Course


Patient was admitted to an exam room, history and physical were obtained. 


Differential diagnoses were considered. On examination lungs are clear, heart 


was regular, abdomen was distended and nontender. Patient did have a fever on 


arrival to the emergency room. As result of that blood cultures were obtained, 


and influenza screen was done. Infant screen was negative. Patient did have an 


elevated white count of 17,000 with a left shift. Patient did have elevated 


liver enzymes of ALT and alkaline phosphatase. CT scan of the abdomen and pelvis


did show significant stool burden with dilation of the distal: Measuring 


approximately 9 cm. I believe that constipation is likely the cause of his 


vomiting. Patient did have infiltrates noted in bilateral bases of the lungs on 


the CT scan. Chest x-ray was done proximal the same time and they did note that 


he did have some infiltrates which could be consistent with a multilobar 


pneumonia consistent with aspiration pneumonia. I suspect the vomiting is likely


the underlying cause of that as well. He just been admitted to the hospital 


recently for pneumonia. He is treated with Unasyn at that time. I did discuss 


the case with Dr. Maharaj, hospitalist, who agreed to accept the patient for 


admission. Urinalysis was ordered which was negative and a culture was performed


on that.


Decision to Disposition Date:  Sep 28, 2018


Decision to Disposition Time:  22:16





Depart


Departure


Latest Vital Signs





Vital Signs








  Date Time  Temp Pulse Resp B/P (MAP) Pulse Ox O2 Delivery O2 Flow Rate FiO2


 


9/28/18 18:05       3.0 


 


9/28/18 17:29 101.3 100 20 107/64 86 Room Air  








Impression:  


   Primary Impression:  


   Aspiration pneumonia


   Additional Impression:  


   Constipation


Condition:  Stable


Disposition:  HOME OR SELF-CARE





Problem Qualifiers








   Primary Impression:  


   Aspiration pneumonia


   Aspiration pneumonia type:  unspecified  Laterality:  bilateral  Lung 


   location:  lower lobe of lung  Qualified Codes:  J69.0 - Pneumonitis due to 


   inhalation of food and vomit


   Additional Impression:  


   Constipation


   Constipation type:  unspecified constipation type  Qualified Codes:  K59.00 -


   Constipation, unspecified








DESIRE AYERS                Sep 28, 2018 17:43

## 2018-09-28 NOTE — HISTORY & PHYSICAL
History of Present Illness


Chief Complaint


Vomiting and fever.


History of Present Illness


18 yr old male from [facility] who has a previous traumatic brain injury with 


quadriplegia who was sent to ER after numerous episodes of vomiting as well as 


low grade fever.  He is unresponsive and not able to provide any type of 


information.  He had a very similar episode a month ago and was diagnosed with 


aspiration pneumonia and constipation.  Treated as inpatient and discharged back


to [facility].  There is no documentation in the EMR as to how well he recovered or 


what follow-up labs or X-rays were done.  Admitted this evening at request of 


FNP in ER.  The ER did speak with his mother over the phone and she requested he


be treated medically but that he is DNR/DNI status otherwise.  This is 


consistent with previous requests.





History


Unable To Obtain Past Medical:  Unable to Obtain/Update


Problems:  


(1) Traumatic brain injury


Status:  Chronic


(2) Quadriplegia


Status:  Chronic


(3) Constipation


Status:  Chronic


Home Meds


Reported Medications


Metoclopramide Hcl (METOCLOPRAMIDE HCL) 5 Mg Tablet, 5 MG PO


   9/28/18


Ondansetron (ZOFRAN ODT) 4 Mg Tab.rapdis, 4 MG PO Q8H PRN for NAUSEA, TAB.SOL


   8/30/18


Baclofen (BACLOFEN) 20 Mg Tablet, 20 MG FT QID, #15 TAB


   8/29/18


Polyethylene Glycol 3350 (MIRALAX) 17 Gm Powd.pack, 17 GM FT DAILY, PKT


   8/29/18


Bisacodyl (BISACODYL) 10 Mg Supp.rect, 10 MG RC DAILY, SUPP.RECT


   8/29/18


Sertraline Hcl (SERTRALINE HCL) 25 Mg Tablet, 1 TAB FT QDAY, TAB


   7/29/18


Quetiapine Fumarate (QUETIAPINE FUMARATE) 25 Mg Tablet, 12.5 MG FT QDAY


   7/29/18


Potassium Chloride (POTASSIUM CHLORIDE) 20 Meq Packet, 20 MEQ FT QDAY, PACKET


   7/29/18


Oxycodone/Acetaminophen (OXYCODONE/ACETAMINOPHEN 5MG/325 MG) 5 Mg/325 Mg Tab, 1 


TAB FT Q8H


   7/29/18


Eyelid Cleanser Combination #5 (OCUSOFT LID SCRUB) 1 Each Med..pad, 1 EACH TP 


BID


   7/29/18


Hyoscyamine Sulfate (LEVSIN) 0.125 Mg Tablet, 0.125 MG FT BID


   7/29/18


Clonazepam (KLONOPIN) 1 Mg Tablet, 1 MG FT BID, #7 TAB


   7/29/18


Glycopyrrolate (GLYCOPYRROLATE) 2 Mg Tablet, 2 MG FT TID


   7/29/18


Levetiracetam (LEVETIRACETAM) 500 Mg Tablet, 500 MG FT BID


   7/28/18


Discontinued Reported Medications


Dantrolene Sodium (DANTROLENE SODIUM) 25 Mg Capsule, 25 MG PO BID, CAPSULE


   8/29/18


Discontinued Scripts


Amoxicillin/Pot Clav 875-125 Mg Tab (AUGMENTIN 875-125 TABLET) 1 Each Tablet, 1 


TAB FT Q12H, #6 TAB


   Prov:ANI LAURA          9/1/18


Allergies:  


Coded Allergies:  


     No Known Drug Allergies (Unverified , 8/8/18)


Hx Smoking:  No


Hx Alcohol Use:  No


Hx Substance Use Disorder:  No





Review of Systems


Constitutional:  Fever


Neurological:  Other (quadriplegia)


Gastrointestinal:  Vomiting, Other (feeding tube)


Genitourinary:  Other (reyna cath)


Psychiatric:  Other (unresponsive.)





Exam


Vital Signs





Vital Signs








  Date Time  Temp Pulse Resp B/P (MAP) Pulse Ox O2 Delivery O2 Flow Rate FiO2


 


9/28/18 23:00    120/69 (86)    


 


9/28/18 22:38  101 20  93   


 


9/28/18 18:05       3.0 


 


9/28/18 17:29 101.3     Room Air  








General Appearance:  Alert, Awake, No Acute Distress, Other (Unable to speak or 


follow commands.)


Neuro:  Other (Able to move extremities to a minimal degree.  Fairly good 


rotation of neck.)


Cardiovascular:  Regular Rate and Rhythm, Other (S1S2 are normal.  No murmur, 


gallops or rubs.)


Respiratory:  Clear to Auscultation


Chest:  Other (Extensive scar from xyphoid process to sternum extending along 


anterior neck to mastoid process on left.  )


GI:  Abd Soft and Non-Tender, Other (BS are present.  Feeding tube LUQ. No 


organomegaly or masses.  )


:  Other (Reyna in place.)


Extremities:  Soft and Non Tender, Warm, Pulses, Perfused, Other (No edema.)


Integumentary:  Other (See above.)


Psych:  Other (Unable to follow commands and does not speak.  Will show facial 


expression with smile.)





Medical Decision Making


Data Points


Result Diagram:  


9/28/18 1804                                                                    


           9/28/18 1804














Item Value  Date Time


 


Calcium Level 9.5 mg/dl 9/28/18 1804


 


Total Bilirubin 1.1 mg/dl 9/28/18 1804


 


Aspartate Amino Transf (AST/SGOT) 54 U/L H 9/28/18 1804


 


Alanine Aminotransferase (ALT/SGPT) 142 U/L H 9/28/18 1804


 


Alkaline Phosphatase 149 U/L H 9/28/18 1804


 


Total Protein 8.3 g/dl H 9/28/18 1804


 


Albumin 4.5 g/dl 9/28/18 1804


 


Amylase Level 73 U/L 9/28/18 1804


 


Lipase 19 U/L L 9/28/18 1804


 


Urine Clarity Slightly-cloudy 9/28/18 1851


 


Urine Color Priscilla 9/28/18 1851


 


Urine pH 6.0 pH 9/28/18 1851


 


Urine Specific Gravity 1.023 9/28/18 1851


 


Urine Protein Negative mg/dL 9/28/18 1851


 


Urine Glucose (UA) Negative mg/dL 9/28/18 1851


 


Urine Ketones Negative mg/dL 9/28/18 1851


 


Urine Blood Negative 9/28/18 1851


 


Urine Nitrite Negative 9/28/18 1851


 


Urine Bilirubin Negative 9/28/18 1851


 


Urine Urobilinogen 2.0 mg/dL 9/28/18 1851


 


Urine Leukocyte Esterase Negative 9/28/18 1851


 


Urine RBC <1 /HPF 9/28/18 1851


 


Urine WBC 3 /HPF 9/28/18 1851


 


Urine Mucus Few /HPF 9/28/18 1851


 


Urine Squamous Epithelial Cells None /LPF 9/28/18 1851


 


Urine Bacteria Negative /HPF 9/28/18 1851


 


Influenza Virus Type A (PCR) Negative 9/28/18 1736


 


Influenza Virus Type B (PCR) Negative 9/28/18 1736











EKG / Imaging


Monitor Interpretation:  Normal Sinus Rhythm


Imaging


CXR and Ct of lower lungs shows patchy infiltrates throughout.


Abdomen shows severe constipation with dilation of rectum.





Pre-Admit Course


ED Medications


Reviewed.


Medical Record Review:  Yes





Assessment and Plan


Problems:  


(1) Aspiration pneumonia


Status:  Acute


Assessment & Plan:  Will use amp/sulbactam 3 gms q 6 hours since it seemed to 


work well last time. Blood and urine cultures have been done.  Flu studies are 


negative.  O2 as needed.  He is a DNR/DNI at mothers request.  





(2) Constipation


Status:  Chronic


Assessment & Plan:  Will use glycerine supp along with SS enema.  Continue tube 


feedings.  Will disimpact if needed.





(3) Elevated liver enzymes


Status:  Acute


Assessment & Plan:  Exact source is unclear.  Will recheck in AM.





(4) Traumatic brain injury


Status:  Chronic


Assessment & Plan:  No changes.





(5) Quadriplegia


Status:  Chronic


Assessment & Plan:  No changes.  





Time Spent on Plan of Care:  > 30 min





Venous Thromboembolism


VTE Risk


Physician Assess for VTE Risk:  Yes


Patient's VTE Risk:  Low





VTE Diagnostic Test


2 Days Prior to Admit:  No





Antithrombotics


Is Pt On Any Antithrombotics?:  No





Prophylaxis Tx Contraindicated


Pharmacological Contraindicati:  Pt at Low Risk for VTE


Mechanical Contraindications:  Pt at Low Risk for VTE





Exam


Sepsis Risk:  No Definite Risk





Problem Qualifiers





(1) Aspiration pneumonia:  


Aspiration pneumonia type:  unspecified  Laterality:  bilateral  Lung location: 


lower lobe of lung  Qualified Codes:  J69.0 - Pneumonitis due to inhalation of 


food and vomit


(2) Constipation:  


Constipation type:  unspecified constipation type  Qualified Codes:  K59.00 - 


Constipation, unspecified








GELY NAVA MD FACP      Sep 28, 2018 22:13

## 2018-09-28 NOTE — RADIOLOGY IMAGING REPORT
FACILITY: [facility] 

 

PATIENT NAME: Zelalem Salinas

: 2000

MR: [id number]

V: [id number]

EXAM DATE: 

ORDERING PHYSICIAN: DESIRE AYERS

TECHNOLOGIST: 

 

Location: [facility]

Patient: Zelalem Salinas

: 2000

MRN: [id number]

Visit/Account:[id number]

Date of Sevice:  2018

 

ACCESSION #: [id number]

 

EXAMINATION: CT abdomen and pelvis with IV contrast

 

HISTORY:  Vomiting.

 

TECHNIQUE:   Axial CT images of the abdomen and pelvis were obtained with IV contrast, with coronal a
nd sagittal 2D reconstructed images.

One of the following dose optimization techniques was utilized in the performance of this exam: Autom
ated exposure control; adjustment of the mA and/or kV according to the patient's size; or use of an i
terative  reconstruction technique.  Specific details can be referenced in the facility's radiology C
T exam operational policy.

 

Contrast:  75 mL of IV Isovue-370.

 

COMPARISON:  2018.

 

FINDINGS:

Image quality is partially degraded by patient motion artifact.

 

Liver:  Negative.

Gallbladder and bile ducts:  Negative.

Spleen:  Negative.

Pancreas:  Negative.

Adrenal glands:  Negative.

Kidneys:  Negative. No hydronephrosis or urinary calculi.

 

Bowel and peritoneum:  There is a large volume of stool in the sigmoid colon and rectum, with a small
 volume of upstream colonic stool. The rectal vault is distended up to 9 cm in diameter. This appears
 similar to the prior exam. Small bowel loops are normal in caliber. No free fluid or free intraperit
godinez air. Percutaneous gastrostomy tube in place. The visualized appendix is unremarkable.

 

Pelvic  structures:  The urinary bladder is decompressed, with a Johnson catheter in place.

Lymph node assessment:  Negative.

Vessels:  Negative.

 

Musculoskeletal:   Negative.

Body wall:  Negative.

Lung bases:  There are patchy airspace opacities in the imaged lower lungs, suspicious for multifocal
 pneumonitis. This could be due to aspiration. Similar pulmonary changes were present on the prior CT
.

 

IMPRESSION:

1. Large volume of stool in the sigmoid colon and rectum suggesting constipation. The rectal vault is
 distended up to 9 cm. Small bowel loops are normal in caliber.

2. Percutaneous gastrostomy tube and Johnson catheter in place.

3. No other acute intra-abdominal findings.

4. There is patchy airspace disease in the imaged lower lungs, suspicious for multifocal pneumonitis.
 This could be due to aspiration.

 

 

Report Dictated By: Deacon Wells MD at 2018 8:37 PM

 

Report E-Signed By: Deacon Wells MD  at 2018 8:42 PM

 

WSN:[id number]

## 2018-09-29 VITALS — DIASTOLIC BLOOD PRESSURE: 65 MMHG | SYSTOLIC BLOOD PRESSURE: 92 MMHG

## 2018-09-29 VITALS — SYSTOLIC BLOOD PRESSURE: 130 MMHG | DIASTOLIC BLOOD PRESSURE: 64 MMHG

## 2018-09-29 VITALS — DIASTOLIC BLOOD PRESSURE: 50 MMHG | SYSTOLIC BLOOD PRESSURE: 111 MMHG

## 2018-09-29 VITALS — SYSTOLIC BLOOD PRESSURE: 97 MMHG | DIASTOLIC BLOOD PRESSURE: 54 MMHG

## 2018-09-29 LAB — PLATELET COUNT, AUTOMATED: 106 K/UL (ref 150–450)

## 2018-09-29 RX ADMIN — AMOXICILLIN AND CLAVULANATE POTASSIUM SCH MG: 875; 125 TABLET, FILM COATED ORAL at 09:53

## 2018-09-29 RX ADMIN — POLYETHYLENE GLYCOL 3350 SCH GM: 17 POWDER, FOR SOLUTION ORAL at 09:40

## 2018-09-29 RX ADMIN — AMOXICILLIN AND CLAVULANATE POTASSIUM SCH MG: 875; 125 TABLET, FILM COATED ORAL at 17:48

## 2018-09-29 NOTE — MEDICAL NUTRITION THERAPY
Nutrition Anthropometrics


Weight (Pounds):  132


Weight (Calculated Kilograms):  59.874


Philip Nutrition Score:         Adequate 


Philip Nutrition Risk Score:  9


Dietary Referral


Nutrition Risk Factors:     Tube Feeding 


Nutrition Risk Comment:  tube feeding





Nutrition/Food History


Tube Feed Prior to Admit





Nutritional Diagnosis


Nutritional Risk Acuity 2:  Tube Feed Stable


Past Medical History:  


Hx of quadriplegia and traumatic brain injury.


Nutritional Acuity:  2-Moderate


Diet Type:  Tube Feeding (TF)


Nutrition Intervention:  Cont diet as ordered





Nutritional Support


Tube Feeding Formulas:  Jevity 1cal/ml-Standard


Tube Feeding Supplement Streng:  Full


Feeding Route:  PEG


Rate:  60 ML/hr


Current Calories:  1526


Current Protein:  64


Total Current Calories:  1526





Nutrition Monitoring & Eval


RD Patient Assessment Time:  30 minutes


RD Assessment Type:  RD Assessment


Patient Nutrition Acuity:  2-Moderate


Follow Up Date:  Sep 30, 2018











LOLA JENKINS          Sep 29, 2018 16:41

## 2018-09-29 NOTE — HOSPITALIST PROGRESS NOTE
Subjective


Progress Notes


Subjective


This patient was admitted for pneumonia and constipation.  He had no acute 


events overnight.





Patient Complains of:


Cardiovascular:  No: Chest Pain


Respiratory:  No: Cough, Shortness of Breath





Physical Exam





Vital Signs








  Date Time  Temp Pulse Resp B/P (MAP) Pulse Ox O2 Delivery O2 Flow Rate FiO2


 


9/29/18 08:04     92 Nasal Cannula 2.0 


 


9/29/18 08:04 98.8 75 20 92/65 (74)    














Intake and Output 


 


 9/29/18





 07:00


 


Intake Total 1313 ml


 


Output Total 1650 ml


 


Balance -337 ml


 


 


 


Intake IV Total 1100 ml


 


Tube Feeding 173 ml


 


Tube Irrigant 40 ml


 


Output Urine Total 1650 ml


 


# Bowel Movements 2








Cardiovascular:  Regular Rate and Rhythm


Respiratory:  Clear to Auscultation


GI:  Soft and Non-Tender


Result Diagram:  


9/29/18 0623                                                                    


           9/29/18 0623














Item Value  Date Time


 


Blood Culture - Preliminary Resulted 9/28/18 1804





Blood NO GROWTH AFTER 1 DAY, REINCUBATED 


 


Blood Culture - Preliminary Resulted 9/28/18 1742





Blood NO GROWTH AFTER 1 DAY, REINCUBATED 








Imaging


Chest x-ray reviewed.


Monitor Interpretation:  Normal Sinus Rhythm





Assessment and Plan


Problems:  


(1) Aspiration pneumonia


Status:  Acute


Assessment & Plan:  He does have a previous history of aspiration.  His chest x-


ray is reported to show interstitial findings.  He did present with vomiting and


cough.  We have been treating him with IV Unasyn.  He did have a fever at 


admission, but has been afebrile since.  We have converted him to oral 


Augmentin.





(2) Constipation


Status:  Chronic


Assessment & Plan:  Resolved with constipation protocol.





(3) Elevated liver enzymes


Status:  Acute


Assessment & Plan:  Exact source is unclear.  





(4) Traumatic brain injury


Status:  Chronic


Assessment & Plan:  No changes.





(5) Quadriplegia


Status:  Chronic


Assessment & Plan:  No changes.  








Exam


Sepsis Risk:  No Definite Risk





Problem Qualifiers





(1) Aspiration pneumonia:  


Aspiration pneumonia type:  unspecified  Laterality:  bilateral  Lung location: 


lower lobe of lung  Qualified Codes:  J69.0 - Pneumonitis due to inhalation of 


food and vomit


(2) Constipation:  


Constipation type:  unspecified constipation type  Qualified Codes:  K59.00 - 


Constipation, unspecified








ANI LAURA DO                  Sep 29, 2018 11:45

## 2018-09-30 VITALS — SYSTOLIC BLOOD PRESSURE: 97 MMHG | DIASTOLIC BLOOD PRESSURE: 59 MMHG

## 2018-09-30 VITALS — DIASTOLIC BLOOD PRESSURE: 79 MMHG | SYSTOLIC BLOOD PRESSURE: 108 MMHG

## 2018-09-30 VITALS — SYSTOLIC BLOOD PRESSURE: 109 MMHG | DIASTOLIC BLOOD PRESSURE: 63 MMHG

## 2018-09-30 VITALS — SYSTOLIC BLOOD PRESSURE: 93 MMHG | DIASTOLIC BLOOD PRESSURE: 75 MMHG

## 2018-09-30 VITALS — SYSTOLIC BLOOD PRESSURE: 103 MMHG | DIASTOLIC BLOOD PRESSURE: 66 MMHG

## 2018-09-30 RX ADMIN — AMOXICILLIN AND CLAVULANATE POTASSIUM SCH MG: 875; 125 TABLET, FILM COATED ORAL at 17:22

## 2018-09-30 RX ADMIN — Medication SCH MEQ: at 12:22

## 2018-09-30 RX ADMIN — BACLOFEN SCH MG: 10 TABLET ORAL at 12:21

## 2018-09-30 RX ADMIN — AMOXICILLIN AND CLAVULANATE POTASSIUM SCH MG: 875; 125 TABLET, FILM COATED ORAL at 08:12

## 2018-09-30 RX ADMIN — BACLOFEN SCH MG: 10 TABLET ORAL at 23:57

## 2018-09-30 RX ADMIN — BACLOFEN SCH MG: 10 TABLET ORAL at 17:22

## 2018-09-30 RX ADMIN — LEVETIRACETAM SCH MLS/HR: 100 INJECTION, SOLUTION INTRAVENOUS at 12:21

## 2018-09-30 RX ADMIN — HYOSCYAMINE SULFATE SCH MG: 0.12 SOLUTION/ DROPS ORAL at 21:42

## 2018-09-30 RX ADMIN — LEVETIRACETAM SCH MLS/HR: 100 INJECTION, SOLUTION INTRAVENOUS at 23:57

## 2018-09-30 RX ADMIN — SERTRALINE HYDROCHLORIDE SCH MG: 50 TABLET, FILM COATED ORAL at 12:21

## 2018-09-30 RX ADMIN — POLYETHYLENE GLYCOL 3350 SCH GM: 17 POWDER, FOR SOLUTION ORAL at 08:24

## 2018-09-30 NOTE — HOSPITALIST PROGRESS NOTE
Phone call to [facility] to discuss pt's d/c instructions. Clarified with Assisted Living that pt has four more doses of Oral Vanco to complete antibiotic therapy. Pt taken out via wheelchair accompanied by staff to cab that will be transporting her to facility   Subjective


Progress Notes


Subjective


Nursing reports the patient's urine output has been low. He is getting his TF 


and free fluid but has continued to have low grade fever.





Physical Exam





Vital Signs








  Date Time  Temp Pulse Resp B/P (MAP) Pulse Ox O2 Delivery O2 Flow Rate FiO2


 


9/30/18 14:47     86   


 


9/30/18 08:20      Nasal Cannula 3.0 


 


9/30/18 08:03 99.0 89 20 93/75 (81)    














Intake and Output 


 


 9/30/18





 07:00


 


Intake Total 2100 ml


 


Output Total 275 ml


 


Balance 1825 ml


 


 


 


Tube Feeding 1554 ml


 


Tube Irrigant 546 ml


 


Output Urine Total 275 ml


 


# Bowel Movements 4


 


# Emeses 1








General Appearance:  Alert, Awake, Other (Nonverbal.)


Neuro:  Other (TBI. Patient does not respond to questions or appear to 


understand.)


Eyes:  PERRLA


Neck:  Other (Large scar L neck.)


Cardiovascular:  Regular Rate and Rhythm


Chest:  Other (Large midline chest scar which continues into his L neck.)


GI:  Soft and Non-Tender, Other (FT in place left abdomen.)


Extremities:  Warm, Other (Flexion contractures. )


Integumentary:  Other (Scarring as above.)


Result Diagram:  


9/29/18 0623 9/29/18 0623





Monitor Interpretation:  Normal Sinus Rhythm





Assessment and Plan


Problems:  


(1) Aspiration pneumonia


Status:  Acute


Assessment & Plan:  He does have a previous history of aspiration.  Per [facility] 


records he is now NPO, just FT diet. His chest x-ray is reported to show 


interstitial findings.  He did present with vomiting and cough.  He was 


initially placed on IV Unasyn.  He did have a fever of 101.3 at admission. 


Looking at his axillary temps, he continues to have a low grade fever with T max


this am of 100 axillary. He was switched to oral Augmentin yesterday. Urine 


output has been poor. Will hydrate and monitor temps. If he continues to be 


febrile, consider restarting Unasyn.





(2) Constipation


Status:  Chronic


Assessment & Plan:  Resolved with constipation protocol.





(3) Elevated liver enzymes


Status:  Acute


Assessment & Plan:  Exact source is unclear.  Improving. 





(4) Traumatic brain injury


Status:  Chronic


Assessment & Plan:  No changes.





(5) Quadriplegia


Status:  Chronic


Assessment & Plan:  No changes.  





Time Spent on Plan of Care:  < 30 min





Exam


Sepsis Risk:  No Definite Risk





Problem Qualifiers





(1) Aspiration pneumonia:  


Aspiration pneumonia type:  unspecified  Laterality:  bilateral  Lung location: 


lower lobe of lung  Qualified Codes:  J69.0 - Pneumonitis due to inhalation of 


food and vomit


(2) Constipation:  


Constipation type:  unspecified constipation type  Qualified Codes:  K59.00 - 


Constipation, unspecified








MELVIN PADILLA MD             Sep 30, 2018 15:24

## 2018-09-30 NOTE — MEDICAL NUTRITION THERAPY
Nutrition Anthropometrics


Weight (Pounds):  132


Weight (Calculated Kilograms):  59.874


Philip Nutrition Score:         Adequate 


Philip Nutrition Risk Score:  9


Dietary Referral


Nutrition Risk Factors:     Tube Feeding 


Nutrition Risk Comment:  tube feeding





Physical Findings


Physical Appearance:  not able to assess BMI


Skin Appearance


Skin Appearance:


Edema


Edema Location Modifier:  


Edema Location:               


Type of Edema:                 


Degree of Edema:


Gastrointestinal Symptoms


GI Symtoms:                 


Tube Present:              PEG, Feeding 


Bowel Sounds:              


Recent Bowel Pattern:   Constipated 


Stool Characteristics:





Nutrition/Food History


Tube Feed Prior to Admit





Nutritional Diagnosis


Nutritional Risk Acuity 2:  Tube Feed Stable


Past Medical History:  


Hx of quadriplegia and traumatic brain injury.


Nutritional Acuity:  2-Moderate


Nutrition Diagnosis:  Increased Nutrient Needs


Nutrition Etiology:  Physiological Causes


Nutrition Problem/Etiology/Sym:  


Increased Nutrient Needs related to physiological causes increasing


nutrient needs, e.g., aspiration pneumonia AEB estimated intake of TF


containing needed nutrients less than estimated requirements.


Energy Requirement:  2100 (Actual BW Kg X 35)


Protein Requirement:  72 (Actual BW Kg X 1.2)


Fluid Requirement:  2100


Diet Type:  Tube Feeding (TF)


Nutrition Intervention:  Cont diet as ordered





Nutritional Support


Tube Feeding Formulas:  Jevity 1cal/ml-Standard


Tube Feeding Supplement Streng:  Full


Feeding Route:  PEG


Rate:  60 ML/hr


Current Calories:  1526


Current Protein:  64


Total Current Calories:  1526


Recommended Enteral / Parental:  Tube Feeding


Recommended Tube Feeding Formu:  Osmolite 1.5cal/ml-Hi Raymundo


Tube Feeding Supplement Streng:  Full


Recommended Feeding Route:  PEG


Recommended Rate:  60 mL/hr


Recommended Calories:  2160


Recommended Protein:  90





Nutrition Monitoring & Eval


RD Patient Assessment Time:  30 minutes


RD Assessment Type:  RD Re-Assessment


Patient Nutrition Acuity:  2-Moderate


Follow Up Date:  Oct 1, 2018


Nutritional Comment:  


9/30/18  [facility] pt admitted for aspiration pneumonia.  Pt is a 18-year-old


traumatic brain injury quadriplegic, on TF at [facility].  Alb 3.8.  Not able to


assess BMI d/t no avaliable height.  Receiving standard Jevity at 60mL/hr.


This provides 1526 Kcals, 1202mL free water and 64 grams of protein per


day.  Using pts wt of 60 Kg X 35 Kcal, pt needs approx. 2100 Kcal/day.


Switching to Osmolite 1.5 would provide 2160 Kcal, 1097 free water, and 90


grams protein per day.  After urine output increases, might consider


changing TF to Osmolite 1.5 @ 60mL/hr.  Follow labs, TF, etc.  -LOLA MOORE          Sep 30, 2018 15:59

## 2018-10-01 ENCOUNTER — HOSPITAL ENCOUNTER (OUTPATIENT)
Dept: HOSPITAL 89 - AMB | Age: 18
End: 2018-10-01
Payer: MEDICAID

## 2018-10-01 VITALS — DIASTOLIC BLOOD PRESSURE: 58 MMHG | SYSTOLIC BLOOD PRESSURE: 88 MMHG

## 2018-10-01 VITALS — SYSTOLIC BLOOD PRESSURE: 84 MMHG | DIASTOLIC BLOOD PRESSURE: 50 MMHG

## 2018-10-01 VITALS — SYSTOLIC BLOOD PRESSURE: 94 MMHG | DIASTOLIC BLOOD PRESSURE: 48 MMHG

## 2018-10-01 VITALS — SYSTOLIC BLOOD PRESSURE: 92 MMHG | DIASTOLIC BLOOD PRESSURE: 56 MMHG

## 2018-10-01 VITALS — SYSTOLIC BLOOD PRESSURE: 110 MMHG | DIASTOLIC BLOOD PRESSURE: 61 MMHG

## 2018-10-01 DIAGNOSIS — G82.50: Primary | ICD-10-CM

## 2018-10-01 LAB — PLATELET COUNT, AUTOMATED: 105 K/UL (ref 150–450)

## 2018-10-01 RX ADMIN — BACLOFEN SCH MG: 10 TABLET ORAL at 11:44

## 2018-10-01 RX ADMIN — BACLOFEN SCH MG: 10 TABLET ORAL at 05:15

## 2018-10-01 RX ADMIN — HYOSCYAMINE SULFATE SCH MG: 0.12 SOLUTION/ DROPS ORAL at 08:16

## 2018-10-01 RX ADMIN — AMOXICILLIN AND CLAVULANATE POTASSIUM SCH MG: 875; 125 TABLET, FILM COATED ORAL at 08:16

## 2018-10-01 RX ADMIN — SERTRALINE HYDROCHLORIDE SCH MG: 50 TABLET, FILM COATED ORAL at 08:15

## 2018-10-01 RX ADMIN — POLYETHYLENE GLYCOL 3350 SCH GM: 17 POWDER, FOR SOLUTION ORAL at 08:16

## 2018-10-01 RX ADMIN — Medication SCH MEQ: at 08:15

## 2018-10-01 NOTE — ANTIMICROBIAL STEWARDSHIP
Antimicrobial Stewardship


Empiricly appropriate:  Yes (Aspiration Pneumonia and UTI)


Significant PMH:  Yes


Duplicate/overlapping Rx:  No


Support empiric regimen:  Yes


Comment


Empiric treatment of Aspiration Pneumonia, non-verbal patient unable to report 


symptoms of UTI, febrile with elevated WBC, blood/urine cultures obtained


Approriate Cultures done:  Yes


Cultures need repeate:  No


Gram stain show Microbs:  Yes


Organism identified:  Yes (UTI- enterococcus - pan-sensitive)


Review the antibiotic sensitiv:  Yes


Renal/Hepatic dosing:  Yes


Appropriate dose for site:  Yes


Reviewed for Drug Interaction:  Yes


Monitored for Toxicities:  Yes


Clinically stable/improving:  Yes


Comment


WBC wnl, afebrile


IV to PO Opportunity:  Yes (Already on oral antibiotics)


Determine cumulative duration:  7-10 days --chronic aspiration risk


Determine standard duration:  7-10 days


Verified plan for regimen:  Yes


Comment


Continue Augmentin 875mg po BID x 7-10 days---will also cover enterococcus UTI











MAURY LYONS                Oct 1, 2018 09:30

## 2018-10-01 NOTE — HOSPITALIST DEPART
Discharge Summary


Reason for Hosp/Final Diag:  


(1) Aspiration pneumonia


Status:  Acute


Hospital Course & Plan:  He does have a previous history of aspiration.  Per [facility]


records he is now NPO, just FT diet. His chest x-ray is reported to show 


interstitial findings.  He did present with vomiting and cough.  He was 


initially placed on IV Unasyn.  He did have a fever of 101.3 at admission, which


has steadily decreased throughout admission. He was switched to oral Augmentin 


9/29. Urine output has improved, reyan to be removed today. He will need four 


more days of Augmentin. He will be transferred back to [facility]. 





(2) Enterococcus UTI


Status:  Acute


Hospital Course & Plan:  A urine culture was performed since he had a fever, 


which showed enterococcus. Augmentin will be continued and is susceptible to 


treat this infection. Recommend total 7 days antibiotic coverage.  





(3) Constipation


Status:  Chronic


Hospital Course & Plan:  Resolved with constipation protocol.





(4) Elevated liver enzymes


Status:  Acute


Hospital Course & Plan:  Exact source is unclear.  Improving. 





(5) Traumatic brain injury


Status:  Chronic


Hospital Course & Plan:  No changes.





(6) Quadriplegia


Status:  Chronic


Hospital Course & Plan:  No changes.  





Departure


Latest Vital Signs





Vital Signs








 10/1/18 10/1/18 10/1/18





 08:47 08:50 09:06


 


Pulse 64  


 


Resp 16  


 


B/P (MAP)   92/56 (68)


 


Pulse Ox  93 


 


O2 Delivery  Nasal Cannula 


 


O2 Flow Rate  1.0 








Weight (Pounds):  132


Weight (Ounces):  8.0


Result Diagram:  


10/1/18 0514                                                                    


           10/1/18 0514





Condition:  Improved


Discharge:  Nursing Home





Discharge Instructions


Home Meds


Reported Medications


Metoclopramide Hcl (METOCLOPRAMIDE HCL) 5 Mg/5 Ml Solution, 5 MG FT Q6H PRN for 


NAUSEA/VOMITING


   9/30/18


Levetiracetam (LEVETIRACETAM) 500 Mg/5 Ml Solution, 5 ML PO Q12H, ML


   9/30/18


Ondansetron (ZOFRAN ODT) 4 Mg Tab.rapdis, 4 MG PO Q8H PRN for NAUSEA, TAB.SOL


   8/30/18


Baclofen (BACLOFEN) 20 Mg Tablet, 20 MG FT Q6H, #15 TAB


   8/29/18


Polyethylene Glycol 3350 (MIRALAX) 17 Gm Powd.pack, 17 GM FT DAILY, PKT


   8/29/18


Bisacodyl (BISACODYL) 10 Mg Supp.rect, 10 MG RC DAILY, SUPP.RECT


   8/29/18


Sertraline Hcl (SERTRALINE HCL) 25 Mg Tablet, 1 TAB FT QDAY, TAB


   7/29/18


Potassium Chloride (POTASSIUM CHLORIDE) 20 Meq Packet, 20 MEQ FT QDAY, PACKET


   7/29/18


Oxycodone/Acetaminophen (OXYCODONE/ACETAMINOPHEN 5MG/325 MG) 5 Mg/325 Mg Tab, 1 


TAB FT HS


   7/29/18


Eyelid Cleanser Combination #5 (OCUSOFT LID SCRUB) 1 Each Med..pad, 1 EACH TP 


BID


   7/29/18


Hyoscyamine Sulfate (LEVSIN) 0.125 Mg Tablet, 0.125 MG FT BID


   7/29/18


Clonazepam (KLONOPIN) 1 Mg Tablet, 1 MG FT BID, #7 TAB


   7/29/18


Glycopyrrolate (GLYCOPYRROLATE) 2 Mg Tablet, 2 MG FT TID


   7/29/18


Discontinued Reported Medications


Metoclopramide Hcl (METOCLOPRAMIDE HCL) 5 Mg Tablet, 5 MG PO


   9/28/18


Quetiapine Fumarate (QUETIAPINE FUMARATE) 25 Mg Tablet, 12.5 MG FT QDAY


   7/29/18


Levetiracetam (LEVETIRACETAM) 500 Mg Tablet, 500 MG FT BID


   7/28/18


Dantrolene Sodium (DANTROLENE SODIUM) 25 Mg Capsule, 25 MG PO BID, CAPSULE


   8/29/18


Discontinued Scripts


Amoxicillin/Pot Clav 875-125 Mg Tab (AUGMENTIN 875-125 TABLET) 1 Each Tablet, 1 


TAB FT Q12H, #6 TAB


   Prov:ANI LAURA DO         9/1/18





Venous Thromboembolism


Antithrombotics


Is Pt On Any Antithrombotics?:  No





Problem Qualifiers





(1) Aspiration pneumonia:  


Aspiration pneumonia type:  unspecified  Laterality:  bilateral  Lung location: 


lower lobe of lung  Qualified Codes:  J69.0 - Pneumonitis due to inhalation of 


food and vomit


(2) Constipation:  


Constipation type:  unspecified constipation type  Qualified Codes:  K59.00 - 


Constipation, unspecified








RHONDA SHIN             Oct 1, 2018 10:09

## 2018-12-28 ENCOUNTER — HOSPITAL ENCOUNTER (OUTPATIENT)
Dept: HOSPITAL 89 - AMB | Age: 18
End: 2018-12-28
Payer: MEDICAID

## 2018-12-28 ENCOUNTER — HOSPITAL ENCOUNTER (INPATIENT)
Dept: HOSPITAL 89 - ER | Age: 18
LOS: 2 days | Discharge: NURSING, REHAB OR LONG-TERM CARE | DRG: 177 | End: 2018-12-30
Attending: INTERNAL MEDICINE | Admitting: INTERNAL MEDICINE
Payer: MEDICAID

## 2018-12-28 VITALS — SYSTOLIC BLOOD PRESSURE: 112 MMHG | DIASTOLIC BLOOD PRESSURE: 70 MMHG

## 2018-12-28 DIAGNOSIS — E87.6: ICD-10-CM

## 2018-12-28 DIAGNOSIS — J69.0: Primary | ICD-10-CM

## 2018-12-28 DIAGNOSIS — E87.0: ICD-10-CM

## 2018-12-28 DIAGNOSIS — G82.50: ICD-10-CM

## 2018-12-28 DIAGNOSIS — K59.09: ICD-10-CM

## 2018-12-28 DIAGNOSIS — R11.2: Primary | ICD-10-CM

## 2018-12-28 DIAGNOSIS — H10.9: ICD-10-CM

## 2018-12-28 DIAGNOSIS — Z87.820: ICD-10-CM

## 2018-12-28 DIAGNOSIS — E86.0: ICD-10-CM

## 2018-12-28 DIAGNOSIS — Z86.73: ICD-10-CM

## 2018-12-28 DIAGNOSIS — Z93.1: ICD-10-CM

## 2018-12-28 LAB — PLATELET COUNT, AUTOMATED: 93 K/UL (ref 150–450)

## 2018-12-28 PROCEDURE — 74177 CT ABD & PELVIS W/CONTRAST: CPT

## 2018-12-28 PROCEDURE — 87088 URINE BACTERIA CULTURE: CPT

## 2018-12-28 PROCEDURE — 96361 HYDRATE IV INFUSION ADD-ON: CPT

## 2018-12-28 PROCEDURE — 36415 COLL VENOUS BLD VENIPUNCTURE: CPT

## 2018-12-28 PROCEDURE — 71045 X-RAY EXAM CHEST 1 VIEW: CPT

## 2018-12-28 PROCEDURE — 84520 ASSAY OF UREA NITROGEN: CPT

## 2018-12-28 PROCEDURE — 96374 THER/PROPH/DIAG INJ IV PUSH: CPT

## 2018-12-28 PROCEDURE — 84450 TRANSFERASE (AST) (SGOT): CPT

## 2018-12-28 PROCEDURE — 82947 ASSAY GLUCOSE BLOOD QUANT: CPT

## 2018-12-28 PROCEDURE — 84075 ASSAY ALKALINE PHOSPHATASE: CPT

## 2018-12-28 PROCEDURE — 87077 CULTURE AEROBIC IDENTIFY: CPT

## 2018-12-28 PROCEDURE — 82310 ASSAY OF CALCIUM: CPT

## 2018-12-28 PROCEDURE — 99285 EMERGENCY DEPT VISIT HI MDM: CPT

## 2018-12-28 PROCEDURE — 81001 URINALYSIS AUTO W/SCOPE: CPT

## 2018-12-28 PROCEDURE — 82040 ASSAY OF SERUM ALBUMIN: CPT

## 2018-12-28 PROCEDURE — 87186 SC STD MICRODIL/AGAR DIL: CPT

## 2018-12-28 PROCEDURE — 84460 ALANINE AMINO (ALT) (SGPT): CPT

## 2018-12-28 PROCEDURE — 85025 COMPLETE CBC W/AUTO DIFF WBC: CPT

## 2018-12-28 PROCEDURE — 84132 ASSAY OF SERUM POTASSIUM: CPT

## 2018-12-28 PROCEDURE — 82435 ASSAY OF BLOOD CHLORIDE: CPT

## 2018-12-28 PROCEDURE — 84295 ASSAY OF SERUM SODIUM: CPT

## 2018-12-28 PROCEDURE — 82374 ASSAY BLOOD CARBON DIOXIDE: CPT

## 2018-12-28 PROCEDURE — 84155 ASSAY OF PROTEIN SERUM: CPT

## 2018-12-28 PROCEDURE — 87040 BLOOD CULTURE FOR BACTERIA: CPT

## 2018-12-28 PROCEDURE — 43760: CPT

## 2018-12-28 PROCEDURE — 82565 ASSAY OF CREATININE: CPT

## 2018-12-28 PROCEDURE — 82247 BILIRUBIN TOTAL: CPT

## 2018-12-28 RX ADMIN — THIAMINE HYDROCHLORIDE PRN MLS/HR: 100 INJECTION, SOLUTION INTRAMUSCULAR; INTRAVENOUS at 23:38

## 2018-12-28 NOTE — RADIOLOGY IMAGING REPORT
FACILITY: [facility] 

 

PATIENT NAME: Zelalem Salinas

: 2000

MR: [id number]

V: [id number]

EXAM DATE: 

ORDERING PHYSICIAN: RADHA MCGUIRE

TECHNOLOGIST: 

 

Location: [facility]

Patient: Zelalem Salinas

: 2000

MRN: [id number]

Visit/Account:[id number]

Date of Sevice: 2018

 

ACCESSION #: [id number]

 

Examination: CHEST SINGLE AP

 

Comparison: 2018 and earlier.  CT abdomen same day.

 

History: Abdomen pain.

 

Findings: Right lung extensive patchy consolidation in a tree-in-bud distribution.  When compared to 
2018, the right lung parenchymal density has significantly progressed in the interval but left l
mikael aeration has improved.  No pneumothorax or effusion.  Cardiac and hilar contour size is within no
rmal limits.  Sternotomy wires are midline and intact.  Large amount of stool in the visualized upper
 abdomen.

 

IMPRESSION:

1.  Right lung extensive patchy consolidation is most suggestive of an infectious bronchiolitis, less
 likely an asymmetric aspiration pneumonitis.

2.  Large amount of stool in the visualized colon.

 

Report Dictated By: Reilly Hodges MD at 2018 9:40 PM

 

Report E-Signed By: Reilly Hodges MD  at 2018 9:45 PM

 

WSN:ANASTACIO

## 2018-12-28 NOTE — RADIOLOGY IMAGING REPORT
FACILITY: [facility] 

 

PATIENT NAME: Zelalem Salinas

: 2000

MR: [id number]

V: [id number]

EXAM DATE: 

ORDERING PHYSICIAN: RADHA MCGUIRE

TECHNOLOGIST: 

 

Location: [facility]

Patient: Zelalem Salinas

: 2000

MRN: [id number]

Visit/Account:[id number]

Date of Sevice: 2018

 

ACCESSION #: [id number]

 

EXAMINATION: CT abdomen and pelvis with contrast

 

COMPARISON: CT 2018

 

HISTORY: Abdomen pain.  Evaluate for obstruction.

 

PROCEDURE: Multiplanar contrast enhanced CT of the abdomen and pelvis with 75 mL intravenous Isovue 3
70. One of the following dose optimization techniques was utilized in the performance of this exam: A
utomated exposure control; adjustment of the mA and/or kV according to the patient's size; or use of 
an iterative  reconstruction technique.  Specific details can be referenced in the facility's radiolo
gy CT exam operational policy.

 

FINDINGS:

Visualized thorax: Patchy consolidative density potentially in a tree-in-bud distribution within the 
visualized right middle and lower lobe has increased since 2018.  Left lung base aeration has im
proved.  Sternotomy.

 

Liver: Negative.

 

Gallbladder and biliary system: Negative

 

Spleen: Negative.

 

Pancreas: Negative.

 

Adrenal glands: Negative.

 

Kidneys and bladder: No renal mass or hydronephrosis.  The urinary bladder is nearly completely empty
; there is significant mass effect on the urinary bladder by the markedly distended rectum.

 

Vessels: Within normal limits.

 

Bowel and mesentery: Normally positioned gastrostomy tube.  No gastric distention.  No small bowel ob
struction or definite evidence of small bowel inflammation.  Appendix is unremarkable.  Very large am
ount of stool throughout the colon and rectum.  The rectum is markedly distended and there is mild re
ctal wall thickening and perirectal stranding.  No pneumatosis or extra luminal gas or fluid collecti
on is identified.

 

Pelvic organs: Negative.

 

Lymph nodes: No adenopathy.

 

Free air/free fluid: None.

 

Abdominal wall and osseous structures: No hernia.  Sternotomy.  No acute changes.

 

IMPRESSION:

 

1.  Very large amount of stool throughout the colon and rectum.  Mild rectal wall inflammation is sug
gestive of a stercoral colitis.

2.  Right lower lobe increased patchy consolidation is suggestive of an infectious bronchiolitis vers
us asymmetric aspiration pneumonitis.

3.  Additional chronic/incidental findings as described above.

 

Report Dictated By: Reilly Hodges MD at 2018 8:47 PM

 

Report E-Signed By: Reilly Hodges MD  at 2018 9:00 PM

 

WSN:ELIZABETH-ELICIA

## 2018-12-28 NOTE — HISTORY & PHYSICAL
History of Present Illness


Chief Complaint


Concern for bowel obstruction by [facility] staff


History of Present Illness


The patient is a 18 year old male with PMH significant for traumatic brain 


injury and quadriplegia who was sent to the ER for evaluation by [facility]. 


Apparently, there was concern about a possible bowel obstruction. No other 


information was given and the patient is noncommunicative. In the ER, evaluation


revealed a slightly distended abdomen and on CT of the abdomen the patient was 


noted to have a large amount of stool. The patient has chronic issues with 


constipation. He was also noted to have a low grade fever in ER and on CT of the


abdomen, the R lung base appeared abnormal. CXR then showed bronchiolitis versus


aspiration pneumonia. The patient does have previous history of aspiration 


pneumonia. He has a chronic feeding tube in place and does not take orals. 


Urinalysis showed 6 WBCs and some RBCs, but was otherwise unremarkable. 





The patient was recommended for admission for evaluation and treatment of his 


constipation and pneumonia.





History


Problems:  


(1) History of aspiration pneumonia


Status:  Resolved


(2) Quadriplegia


Status:  Chronic


(3) Traumatic brain injury


Status:  Chronic


(4) Elevated liver enzymes


Status:  Chronic


(5) Constipation


Status:  Chronic


(6) Enterococcus UTI


Status:  Resolved


Home Meds


Reported Medications


Metoclopramide Hcl (METOCLOPRAMIDE HCL) 5 Mg/5 Ml Solution, 5 MG FT Q6H PRN for 


NAUSEA/VOMITING


   9/30/18


Levetiracetam (LEVETIRACETAM) 500 Mg/5 Ml Solution, 5 ML PO Q12H, ML


   9/30/18


Ondansetron (ZOFRAN ODT) 4 Mg Tab.rapdis, 4 MG PO Q8H PRN for NAUSEA, TAB.SOL


   8/30/18


Baclofen (BACLOFEN) 20 Mg Tablet, 20 MG FT Q6H, #15 TAB


   8/29/18


Polyethylene Glycol 3350 (MIRALAX) 17 Gm Powd.pack, 17 GM FT DAILY, PKT


   8/29/18


Bisacodyl (BISACODYL) 10 Mg Supp.rect, 10 MG RC PRN, SUPP.RECT


   8/29/18


Sertraline Hcl (SERTRALINE HCL) 25 Mg Tablet, 1 TAB FT QDAY, TAB


   7/29/18


Potassium Chloride (POTASSIUM CHLORIDE) 20 Meq Packet, 20 MEQ FT QDAY, PACKET


   7/29/18


Oxycodone/Acetaminophen (OXYCODONE/ACETAMINOPHEN 5MG/325 MG) 5 Mg/325 Mg Tab, 1 


TAB FT HS


   7/29/18


Eyelid Cleanser Combination #5 (OCUSOFT LID SCRUB) 1 Each Med..pad, 1 EACH TP 


BID


   7/29/18


Hyoscyamine Sulfate (LEVSIN) 0.125 Mg Tablet, 0.125 MG FT BID


   7/29/18


Clonazepam (KLONOPIN) 1 Mg Tablet, 1 MG FT BID, #7 TAB


   7/29/18


Glycopyrrolate (GLYCOPYRROLATE) 2 Mg Tablet, 2 MG FT TID


   7/29/18


Discontinued Scripts


Amoxicillin/Potassium Clav (AMOX TR-K -125 MG TAB) 1 Each Tablet, 875 MG 


PO BIDBS, #8 TAB


   Prov:RHONDA SHIN FNP         10/1/18


Allergies:  


Coded Allergies:  


     No Known Drug Allergies (Unverified , 8/8/18)


Other Social/Family Hx


The patient resides at [facility]. Other social and family history is not able to be 


obtained.


Hx Smoking:  No (Pt nonverbal)


Hx Alcohol Use:  No


Hx Substance Use Disorder:  No


History of IV Drug Use:  No





Review of Systems


Constitutional:  Fever


Neurological:  Other (Quadraplegia.)


Gastrointestinal:  Constipation


Other


Unable to obtain complete ROS as patient is noncommunicative.





Exam


Vital Signs





Vital Signs








  Date Time  Temp Pulse Resp B/P (MAP) Pulse Ox O2 Delivery O2 Flow Rate FiO2


 


12/28/18 21:30    117/73 (88)    


 


12/28/18 21:25  93   96   


 


12/28/18 19:01 100.1  15   Nasal Cannula  








General Appearance:  Alert, Awake, Other (Appears intermittently uncomfortable.)


Neuro:  Other (Noncommunicative, quadraplegic)


Eyes:  Other (Both conjunctiva injected with purulent drainage visible in lashes


R>L.)


ENT:  Other (Mucous membranes somewhat dry.)


Cardiovascular:  Regular Rate and Rhythm


Respiratory:  No Respiratory Distress


GI:  Other (Abdomen slightly distended with positive BS. )


Extremities:  Warm, Perfused, Other (No edema. Contractures of both LE and 


fingers bilaterally.)


Integumentary:  Other (Scattered pustules over face, chest and back. )


Psych:  Other (Noncommunicative.)





Medical Decision Making


Data Points


Result Diagram:  


12/28/18 1930                                                                   


            12/28/18 1930














Item Value  Date Time


 


Urine Color Priscilla 12/28/18 1920


 


Urine Clarity Slightly-cloudy 12/28/18 1920


 


Urine pH 5.0 pH 12/28/18 1920


 


Urine Specific Gravity 1.031 12/28/18 1920


 


Urine Protein 30 mg/dL 12/28/18 1920


 


Urine Glucose (UA) Negative mg/dL 12/28/18 1920


 


Urine Ketones Trace mg/dL 12/28/18 1920


 


Urine Blood Negative 12/28/18 1920


 


Urine Nitrite Negative 12/28/18 1920


 


Urine Bilirubin Negative 12/28/18 1920


 


Urine Urobilinogen 2.0 mg/dL 12/28/18 1920


 


Urine Leukocyte Esterase Negative 12/28/18 1920


 


Urine RBC 37 /HPF 12/28/18 1920


 


Urine WBC 6 /HPF 12/28/18 1920


 


Urine Squamous Epithelial Cells Few /LPF 12/28/18 1920


 


Urine Bacteria Few /HPF 12/28/18 1920


 


Urine Mucus Few /HPF 12/28/18 1920


 


Calcium Level 9.3 mg/dl 12/28/18 1930


 


Total Bilirubin 1.1 mg/dl 12/28/18 1930


 


Aspartate Amino Transf (AST/SGOT) 58 U/L H 12/28/18 1930


 


Alanine Aminotransferase (ALT/SGPT) 148 U/L H 12/28/18 1930


 


Alkaline Phosphatase 192 U/L H 12/28/18 1930


 


Total Protein 8.5 g/dl H 12/28/18 1930


 


Albumin 4.3 g/dl 12/28/18 1930








Blood and urine cultures pending.





Pre-Admit Course


Medical Record Review:  Yes





Assessment and Plan


Problems:  


(1) Aspiration pneumonia


Status:  Acute


Assessment & Plan:  Will start Unasyn 3g IV q 6hrs. Will hydrate with NS. Adjust


O2 as needed to keep sats 90% or greater. 





(2) Constipation


Status:  Chronic


Assessment & Plan:  The patient had a large BM in ER prior to transfer to the 


medical floor. Monitor overnight and consider further treatment in am if needed.





(3) Elevated liver enzymes


Status:  Chronic


Assessment & Plan:  The patient has had elevated LFTs intermittently when 


reviewing the EMR. Etiology unclear. Will hydrate and repeat CMP in am.





(4) Traumatic brain injury


Status:  Chronic


Assessment & Plan:  Stable.





(5) Quadriplegia


Status:  Chronic


Assessment & Plan:  Stable.





(6) Hypokalemia


Status:  Acute


Assessment & Plan:  K rider ordered. Repeat CMP in am.





(7) Hypernatremia


Status:  Acute


Assessment & Plan:  Likely due to dehydration. Will gently hydrate and recheck 


CMP in am.





(8) Conjunctivitis


Status:  Acute


Assessment & Plan:  Will order Tobramycin ophth gtts. 





Time Spent on Plan of Care:  < 30 min





Venous Thromboembolism


Antithrombotics


Is Pt On Any Antithrombotics?:  No





Exam


Sepsis Risk:  No Definite Risk





Problem Qualifiers





(1) Constipation:  


Constipation type:  other constipation type  Qualified Codes:  K59.09 - Other 


constipation








MELVIN PADILLA MD             Dec 28, 2018 23:50

## 2018-12-28 NOTE — ER REPORT
History and Physical


Time Seen By MD:  19:08


Hx. of Stated Complaint:  


Per report from [facility], patient has been vomiting and unable to 


tolerate any tube feedings


HPI/ROS


CHIEF COMPLAINT: Possible bowel obstruction





HISTORY OF PRESENT ILLNESS: This is a 18-year-old male who presents to the 


emergency department from the [facility] for a possible bowel 


obstruction. 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 


and multiple orthopedic injuries, the patient has also had a CVA, he also has a 


gastrostomy to indwelling catheter. The staff at the [facility] became 


concerned of the changes in the patient's stool, and sent him to the emergency 


department for possible bowel obstruction evaluation. The patient is 


unresponsive, he will open his eyes, he will track but unable to answer 


questions, has contractures this is the patient's baseline. No apparent fevers. 


No concerns of aspiration pneumonia at this time. No other complaints at this 


time.





REVIEW OF SYSTEMS:


Constitutional: No fever, no chills.


Eyes: No discharge.


ENT: No sore throat. 


Cardiovascular: No chest pain, no palpitations.


Respiratory: No cough, no shortness of breath.


Gastrointestinal: As above.


Genitourinary: No hematuria.


Musculoskeletal: No back pain.


Skin: No rashes.


Neurological: No headache.


Allergies:  


Coded Allergies:  


     No Known Drug Allergies (Unverified , 18)


Home Meds


Active Scripts


Amoxicillin/Potassium Clav (AMOX TR-K -125 MG TAB) 1 Each Tablet, 875 MG 


PO BIDBS, #8 TAB


   Prov:SHINRHONDA FNP         10/1/18


Reported Medications


Metoclopramide Hcl (METOCLOPRAMIDE HCL) 5 Mg/5 Ml Solution, 5 MG FT Q6H PRN for 


NAUSEA/VOMITING


   18


Levetiracetam (LEVETIRACETAM) 500 Mg/5 Ml Solution, 5 ML PO Q12H, ML


   18


Ondansetron (ZOFRAN ODT) 4 Mg Tab.rapdis, 4 MG PO Q8H PRN for NAUSEA, TAB.SOL


   18


Baclofen (BACLOFEN) 20 Mg Tablet, 20 MG FT Q6H, #15 TAB


   18


Polyethylene Glycol 3350 (MIRALAX) 17 Gm Powd.pack, 17 GM FT DAILY, PKT


   18


Bisacodyl (BISACODYL) 10 Mg Supp.rect, 10 MG RC DAILY, SUPP.RECT


   18


Sertraline Hcl (SERTRALINE HCL) 25 Mg Tablet, 1 TAB FT QDAY, TAB


   18


Potassium Chloride (POTASSIUM CHLORIDE) 20 Meq Packet, 20 MEQ FT QDAY, PACKET


   18


Oxycodone/Acetaminophen (OXYCODONE/ACETAMINOPHEN 5MG/325 MG) 5 Mg/325 Mg Tab, 1 


TAB FT HS


   18


Eyelid Cleanser Combination #5 (OCUSOFT LID SCRUB) 1 Each Med..pad, 1 EACH TP 


BID


   18


Hyoscyamine Sulfate (LEVSIN) 0.125 Mg Tablet, 0.125 MG FT BID


   18


Clonazepam (KLONOPIN) 1 Mg Tablet, 1 MG FT BID, #7 TAB


   18


Glycopyrrolate (GLYCOPYRROLATE) 2 Mg Tablet, 2 MG FT TID


   18


Past Medical/Surgical History


The patient has a past medical surgical history of renal injury, quadriplegia, 


CVA, TIA, carotid injury, tachycardia, PEG tube, contractures multiple with peak


surgeries.


Reviewed Nurses Notes:  Yes


Hx Smoking:  No (Pt nonverbal)


Hx Substance Use Disorder:  No


Hx Alcohol Use:  No


Constitutional





Vital Sign - Last 24 Hours








 18





 18:52 19:01 19:03 19:20


 


Temp  100.1  


 


Pulse  112  115


 


Resp  15  


 


B/P (MAP) 143/114 (124) 143/112 126/80 (95) 


 


Pulse Ox  96  95


 


O2 Delivery  Nasal Cannula  


 


    





 18 





 19:50 20:00 20:20 


 


Pulse 95   


 


B/P (MAP)  122/81 (95)  


 


Pulse Ox 95  90 








Physical Exam


   General Appearance: The patient is alert, has no immediate need for airway 


protection and no signs of toxicity.


Eyes: Pupils equal and round no pallor or injection.


ENT, Mouth: Mucous membranes are moist.


Respiratory: There are no retractions, lungs are clear to auscultation.


Cardiovascular: Regular rate and rhythm, murmur, no clicks or rubs.


Gastrointestinal:  Abdomen is soft, mild guarding to the right side, 


hyperresonant and distant bowel sounds however there are bowel sounds in all 4 


quadrants. No abdominal bruits. Gastrostomy tube in place, no skin breakdown or 


purulent drainage.


Neurological: The patient is at his baseline mental status.


Skin: Warm and dry, no rashes.


Musculoskeletal:  Neck is supple non tender.


      Extremities are nontender, nonswollen and have full range of motion.





DIFFERENTIAL DIAGNOSIS: After history and physical exam differential diagnosis 


was considered for abdominal pain including but not limited to appendicitis, 


bowel obstruction, cholecystitis, gastritis and urinary tract infection.





Medical Decision Making


Data Points


Result Diagram:  


18





Laboratory





Hematology








Test


 18


19:20 18


19:30


 


Urine Color Priscilla  


 


Urine Clarity


 Slightly-cloudy


 





 


Urine pH


 5.0 pH


(4.8-9.5) 





 


Urine Specific Gravity 1.031  


 


Urine Protein


 30 mg/dL


(NEGATIVE) 





 


Urine Glucose (UA)


 Negative mg/dL


(NEGATIVE) 





 


Urine Ketones


 Trace mg/dL


(NEGATIVE) 





 


Urine Blood


 Negative


(NEGATIVE) 





 


Urine Nitrite


 Negative


(NEGATIVE) 





 


Urine Bilirubin


 Negative


(NEGATIVE) 





 


Urine Urobilinogen


 2.0 mg/dL


(0.2-1.9) 





 


Urine Leukocyte Esterase


 Negative


(NEGATIVE) 





 


Urine RBC


 37 /HPF


(0-2/HPF) 





 


Urine WBC


 6 /HPF


(0-5/HPF) 





 


Urine Squamous Epithelial


Cells Few /LPF


(NONE-FEW) 





 


Urine Bacteria


 Few /HPF


(NONE-FEW) 





 


Urine Mucus


 Few /HPF


(NONE-FEW) 





 


Red Blood Count


 


 6.02 M/uL


(4.00-5.60)


 


Mean Corpuscular Volume


 


 89.8 fL


(80.0-96.0)


 


Mean Corpuscular Hemoglobin


 


 30.6 pg


(26.0-33.0)


 


Mean Corpuscular Hemoglobin


Concent 


 34.0 g/dL


(32.0-36.0)


 


Red Cell Distribution Width


 


 14.3 %


(11.5-14.5)


 


Mean Platelet Volume


 


 13.2 fL


(7.2-11.1)


 


Neutrophils (%) (Auto)


 


 72.8 %


(39.4-72.5)


 


Lymphocytes (%) (Auto)


 


 19.2 %


(17.6-49.6)


 


Monocytes (%) (Auto)


 


 7.2 %


(4.1-12.4)


 


Eosinophils (%) (Auto)


 


 0.6 %


(0.4-6.7)


 


Basophils (%) (Auto)


 


 0.2 %


(0.3-1.4)


 


Nucleated RBC Relative Count


(auto) 


 0.1 /100WBC 





 


Neutrophils # (Auto)


 


 7.3 K/uL


(2.0-7.4)


 


Lymphocytes # (Auto)


 


 1.9 K/uL


(1.3-3.6)


 


Monocytes # (Auto)


 


 0.7 K/uL


(0.3-1.0)


 


Eosinophils # (Auto)


 


 0.1 K/uL


(0.0-0.5)


 


Basophils # (Auto)


 


 0.0 K/uL


(0.0-0.1)


 


Nucleated RBC Absolute Count


(auto) 


 0.01 K/uL 





 


Peripheral Blood Smear  Yes Y/N 


 


Sodium Level


 


 151 mmol/L


(137-145)


 


Potassium Level


 


 3.3 mmol/L


(3.5-5.0)


 


Chloride Level


 


 111 mmol/L


()


 


Carbon Dioxide Level


 


 32 mmol/L


(22-30)


 


Blood Urea Nitrogen


 


 33 mg/dl


(9-21)


 


Creatinine


 


 1.00 mg/dl


(0.66-1.25)


 


Glomerular Filtration Rate


Calc 


 > 60.0 





 


Random Glucose


 


 104 mg/dl


()


 


Calcium Level


 


 9.3 mg/dl


(8.4-10.2)


 


Total Bilirubin


 


 1.1 mg/dl


(0.2-1.3)


 


Aspartate Amino Transf


(AST/SGOT) 


 58 U/L (0-35) 





 


Alanine Aminotransferase


(ALT/SGPT) 


 148 U/L (0-56) 





 


Alkaline Phosphatase


 


 192 U/L


(0-126)


 


Total Protein


 


 8.5 g/dl


(6.3-8.2)


 


Albumin


 


 4.3 g/dl


(3.5-5.0)








Chemistry








Test


 18


19:20 18


19:30


 


Urine Color Priscilla  


 


Urine Clarity


 Slightly-cloudy


 





 


Urine pH


 5.0 pH


(4.8-9.5) 





 


Urine Specific Gravity 1.031  


 


Urine Protein


 30 mg/dL


(NEGATIVE) 





 


Urine Glucose (UA)


 Negative mg/dL


(NEGATIVE) 





 


Urine Ketones


 Trace mg/dL


(NEGATIVE) 





 


Urine Blood


 Negative


(NEGATIVE) 





 


Urine Nitrite


 Negative


(NEGATIVE) 





 


Urine Bilirubin


 Negative


(NEGATIVE) 





 


Urine Urobilinogen


 2.0 mg/dL


(0.2-1.9) 





 


Urine Leukocyte Esterase


 Negative


(NEGATIVE) 





 


Urine RBC


 37 /HPF


(0-2/HPF) 





 


Urine WBC


 6 /HPF


(0-5/HPF) 





 


Urine Squamous Epithelial


Cells Few /LPF


(NONE-FEW) 





 


Urine Bacteria


 Few /HPF


(NONE-FEW) 





 


Urine Mucus


 Few /HPF


(NONE-FEW) 





 


White Blood Count


 


 10.0 k/uL


(4.5-11.0)


 


Red Blood Count


 


 6.02 M/uL


(4.00-5.60)


 


Hemoglobin


 


 18.4 g/dL


(14.0-18.0)


 


Hematocrit


 


 54.0 %


(42.0-52.0)


 


Mean Corpuscular Volume


 


 89.8 fL


(80.0-96.0)


 


Mean Corpuscular Hemoglobin


 


 30.6 pg


(26.0-33.0)


 


Mean Corpuscular Hemoglobin


Concent 


 34.0 g/dL


(32.0-36.0)


 


Red Cell Distribution Width


 


 14.3 %


(11.5-14.5)


 


Platelet Count


 


 93 K/uL


(150-450)


 


Mean Platelet Volume


 


 13.2 fL


(7.2-11.1)


 


Neutrophils (%) (Auto)


 


 72.8 %


(39.4-72.5)


 


Lymphocytes (%) (Auto)


 


 19.2 %


(17.6-49.6)


 


Monocytes (%) (Auto)


 


 7.2 %


(4.1-12.4)


 


Eosinophils (%) (Auto)


 


 0.6 %


(0.4-6.7)


 


Basophils (%) (Auto)


 


 0.2 %


(0.3-1.4)


 


Nucleated RBC Relative Count


(auto) 


 0.1 /100WBC 





 


Neutrophils # (Auto)


 


 7.3 K/uL


(2.0-7.4)


 


Lymphocytes # (Auto)


 


 1.9 K/uL


(1.3-3.6)


 


Monocytes # (Auto)


 


 0.7 K/uL


(0.3-1.0)


 


Eosinophils # (Auto)


 


 0.1 K/uL


(0.0-0.5)


 


Basophils # (Auto)


 


 0.0 K/uL


(0.0-0.1)


 


Nucleated RBC Absolute Count


(auto) 


 0.01 K/uL 





 


Peripheral Blood Smear  Yes Y/N 


 


Glomerular Filtration Rate


Calc 


 > 60.0 





 


Calcium Level


 


 9.3 mg/dl


(8.4-10.2)


 


Total Bilirubin


 


 1.1 mg/dl


(0.2-1.3)


 


Aspartate Amino Transf


(AST/SGOT) 


 58 U/L (0-35) 





 


Alanine Aminotransferase


(ALT/SGPT) 


 148 U/L (0-56) 





 


Alkaline Phosphatase


 


 192 U/L


(0-126)


 


Total Protein


 


 8.5 g/dl


(6.3-8.2)


 


Albumin


 


 4.3 g/dl


(3.5-5.0)








Urinalysis








Test


 18


19:20


 


Urine Color Priscilla 


 


Urine Clarity


 Slightly-cloudy





 


Urine pH


 5.0 pH


(4.8-9.5)


 


Urine Specific Gravity 1.031 


 


Urine Protein


 30 mg/dL


(NEGATIVE)


 


Urine Glucose (UA)


 Negative mg/dL


(NEGATIVE)


 


Urine Ketones


 Trace mg/dL


(NEGATIVE)


 


Urine Blood


 Negative


(NEGATIVE)


 


Urine Nitrite


 Negative


(NEGATIVE)


 


Urine Bilirubin


 Negative


(NEGATIVE)


 


Urine Urobilinogen


 2.0 mg/dL


(0.2-1.9)


 


Urine Leukocyte Esterase


 Negative


(NEGATIVE)


 


Urine RBC


 37 /HPF


(0-2/HPF)


 


Urine WBC


 6 /HPF


(0-5/HPF)


 


Urine Squamous Epithelial


Cells Few /LPF


(NONE-FEW)


 


Urine Bacteria


 Few /HPF


(NONE-FEW)


 


Urine Mucus


 Few /HPF


(NONE-FEW)











EKG/Imaging


Imaging


PATIENT NAME: Zelalem Salinas


: 2000


MR: [id number]


V: [id number]


EXAM DATE: 


ORDERING PHYSICIAN: RADHA MCGUIRE


TECHNOLOGIST: 


 


Location: [facility]


Patient: Zelalem Salinas


: 2000


MRN: [id number]


Visit/Account:[id number]


Date of Sevice: 2018


 


ACCESSION #: [id number]


 


EXAMINATION: CT abdomen and pelvis with contrast


 


COMPARISON: CT 2018


 


HISTORY: Abdomen pain.  Evaluate for obstruction.


 


PROCEDURE: Multiplanar contrast enhanced CT of the abdomen and pelvis with 75 mL


intravenous Isovue 370. One of the following dose optimization techniques was 


utilized in the performance of this exam: Automated exposure control; adjustment


of the mA and/or kV according to the patient's size; or use of an iterative  


reconstruction technique.  Specific details can be referenced in the facility's 


radiology CT exam operational policy.


 


FINDINGS:


Visualized thorax: Patchy consolidative density potentially in a tree-in-bud 


distribution within the visualized right middle and lower lobe has increased 


since 2018.  Left lung base aeration has improved.  Sternotomy.


 


Liver: Negative.


 


Gallbladder and biliary system: Negative


 


Spleen: Negative.


 


Pancreas: Negative.


 


Adrenal glands: Negative.


 


Kidneys and bladder: No renal mass or hydronephrosis.  The urinary bladder is 


nearly completely empty; there is significant mass effect on the urinary bladder


by the markedly distended rectum.


 


Vessels: Within normal limits.


 


Bowel and mesentery: Normally positioned gastrostomy tube.  No gastric 


distention.  No small bowel obstruction or definite evidence of small bowel 


inflammation.  Appendix is unremarkable.  Very large amount of stool throughout 


the colon and rectum.  The rectum is markedly distended and there is mild rectal


wall thickening and perirectal stranding.  No pneumatosis or extra luminal gas 


or fluid collection is identified.


 


Pelvic organs: Negative.


 


Lymph nodes: No adenopathy.


 


Free air/free fluid: None.


 


Abdominal wall and osseous structures: No hernia.  Sternotomy.  No acute 


changes.


 


IMPRESSION:


 


1.  Very large amount of stool throughout the colon and rectum.  Mild rectal 


wall inflammation is suggestive of a stercoral colitis.


2.  Right lower lobe increased patchy consolidation is suggestive of an 


infectious bronchiolitis versus asymmetric aspiration pneumonitis.


3.  Additional chronic/incidental findings as described above.


 


Report Dictated By: Reilly Hodges MD at 2018 8:47 PM


 


Report E-Signed By: Reilly Hodges MD  at 2018 9:00 PM


 


WSN:[id number]





Location: [facility]


Patient: Zelalem Salinas


: 2000


MRN: [id number]


Visit/Account:[id number]


Date of Sevice: 2018


 


ACCESSION #: [id number]


 


Examination: CHEST SINGLE AP


 


Comparison: 2018 and earlier.  CT abdomen same day.


 


History: Abdomen pain.


 


Findings: Right lung extensive patchy consolidation in a tree-in-bud 


distribution.  When compared to 2018, the right lung parenchymal density 


has significantly progressed in the interval but left lung aeration has 


improved.  No pneumothorax or effusion.  Cardiac and hilar contour size is 


within normal limits.  Sternotomy wires are midline and intact.  Large amount of


stool in the visualized upper abdomen.


 


IMPRESSION:


1.  Right lung extensive patchy consolidation is most suggestive of an 


infectious bronchiolitis, less likely an asymmetric aspiration pneumonitis.


2.  Large amount of stool in the visualized colon.


 


Report Dictated By: Reilly Hodges MD at 2018 9:40 PM


 


Report E-Signed By: Reilly Hodges MD  at 2018 9:45 PM


 


WSN:[id number]





ED Course/Re-evaluation


Clinical Indication for ER IV:  Hydration, IV Access


ED Course


The patient was admitted to room. A very limited history for the current illness


was obtained, I did not get a phone call or any other information from the 


[facility], a physical was obtained. Differential diagnoses were 


considered. An IV was started. A CBC, CMP, UA were collected. On examination the


patient's abdomen was somewhat firm, hyperresonant with distant but active bowel


sounds in all quadrants, I did CT the patient's abdomen, CT showing a very large


amount of stool throughout the colon and rectum, mild rectal wall inflammation 


is suggestive of a stir choral colitis, right lower lobe increased patchy 


consolidation is suggestive of an infectious bronchiolitis versus asymmetric 


aspiration pneumonitis. CBC showing RBC 6.02, hemoglobin and hematocrit 18.4 and


54.0, platelets 93, sodium 151, potassium 3.3 chloride 111, CO2 32, BUN 33, AST 


58,  out phosphatase 192, based on the patient's historical data the 


chemistry is different today. The patient is likely under hydrated. 1 L normal 


saline given at 500 mils an hour. cath UA showing specific gravity 1.031, 


proteinuria trace ketones, with 37 urine red blood cells, likely traumatic 


catheterization, with squamous epithelial cells. I did review the case with Dr. MARIAH Kaye as noted below, she has accepted the patient into the hospitalist 


services for constipation, patient will be admitted to the medical floor. The 


concern was With the amount of stool burden in the colon and rectum we would not


be able to fully evacuate the patient tonight, this will likely take a day or so


to resolve. There is also a concern about aspiration pneumonitis versus 


infectious bronchiolitis. I am getting a single view chest x-ray at this time.








 2018 9:36:15 pm I did speak with Dr. MARIAH Kaey the hospitalist on call, 


we discussed the patient's case, we will admit the patient to the medical floor.








 2018 9:45:32 pm the patient became nauseous, began retching very loudly, 


I did enter the room with the nurse, patient began to throw up, patient was 


assisted to a more erect position, suction was performed. Patient was cleaned, 4


mg IV Zofran were given.


Decision to Disposition Date:  Dec 28, 2018


Decision to Disposition Time:  21:36





Depart


Departure


Latest Vital Signs





Vital Signs








  Date Time  Temp Pulse Resp B/P (MAP) Pulse Ox O2 Delivery O2 Flow Rate FiO2


 


18 20:20     90   


 


18 20:00    122/81 (95)    


 


18 19:50  95      


 


18 19:01 100.1  15   Nasal Cannula  








Impression:  


   Primary Impression:  


   Constipation


Condition:  Condition Unchanged


Disposition:  Admitted from ER





Problem Qualifiers








   Primary Impression:  


   Constipation


   Constipation type:  other constipation type  Qualified Codes:  K59.09 - Other


   constipation








RADHA MCGUIREP-BC      Dec 28, 2018 19:14

## 2018-12-29 VITALS — DIASTOLIC BLOOD PRESSURE: 61 MMHG | SYSTOLIC BLOOD PRESSURE: 111 MMHG

## 2018-12-29 VITALS — SYSTOLIC BLOOD PRESSURE: 101 MMHG | DIASTOLIC BLOOD PRESSURE: 58 MMHG

## 2018-12-29 VITALS — DIASTOLIC BLOOD PRESSURE: 65 MMHG | SYSTOLIC BLOOD PRESSURE: 100 MMHG

## 2018-12-29 VITALS — DIASTOLIC BLOOD PRESSURE: 58 MMHG | SYSTOLIC BLOOD PRESSURE: 103 MMHG

## 2018-12-29 VITALS — SYSTOLIC BLOOD PRESSURE: 106 MMHG | DIASTOLIC BLOOD PRESSURE: 59 MMHG

## 2018-12-29 LAB — PLATELET COUNT, AUTOMATED: 80 K/UL (ref 150–450)

## 2018-12-29 PROCEDURE — 0D20XUZ CHANGE FEEDING DEVICE IN UPPER INTESTINAL TRACT, EXTERNAL APPROACH: ICD-10-PCS | Performed by: SURGERY

## 2018-12-29 RX ADMIN — SERTRALINE HYDROCHLORIDE SCH MG: 50 TABLET, FILM COATED ORAL at 09:01

## 2018-12-29 RX ADMIN — AMPICILLIN SODIUM AND SULBACTAM SODIUM SCH MLS/HR: 2; 1 INJECTION, POWDER, FOR SOLUTION INTRAVENOUS at 00:35

## 2018-12-29 RX ADMIN — LEVETIRACETAM SCH MLS/HR: 100 INJECTION, SOLUTION INTRAVENOUS at 09:01

## 2018-12-29 RX ADMIN — TOBRAMYCIN SCH DROP: 3 SOLUTION OPHTHALMIC at 09:01

## 2018-12-29 RX ADMIN — BACLOFEN SCH MG: 10 TABLET ORAL at 18:13

## 2018-12-29 RX ADMIN — AMPICILLIN SODIUM AND SULBACTAM SODIUM SCH MLS/HR: 2; 1 INJECTION, POWDER, FOR SOLUTION INTRAVENOUS at 17:50

## 2018-12-29 RX ADMIN — TOBRAMYCIN SCH ML: 3 SOLUTION OPHTHALMIC at 17:49

## 2018-12-29 RX ADMIN — TOBRAMYCIN SCH DROP: 3 SOLUTION OPHTHALMIC at 05:58

## 2018-12-29 RX ADMIN — TOBRAMYCIN SCH DROP: 3 SOLUTION OPHTHALMIC at 14:10

## 2018-12-29 RX ADMIN — AMPICILLIN SODIUM AND SULBACTAM SODIUM SCH MLS/HR: 2; 1 INJECTION, POWDER, FOR SOLUTION INTRAVENOUS at 06:22

## 2018-12-29 RX ADMIN — POLYETHYLENE GLYCOL 3350 SCH GM: 17 POWDER, FOR SOLUTION ORAL at 09:01

## 2018-12-29 RX ADMIN — BACLOFEN SCH MG: 10 TABLET ORAL at 05:58

## 2018-12-29 RX ADMIN — BACLOFEN SCH MG: 10 TABLET ORAL at 12:02

## 2018-12-29 RX ADMIN — TOBRAMYCIN SCH DROP: 3 SOLUTION OPHTHALMIC at 21:37

## 2018-12-29 RX ADMIN — LEVETIRACETAM SCH MLS/HR: 100 INJECTION, SOLUTION INTRAVENOUS at 21:36

## 2018-12-29 RX ADMIN — LEVETIRACETAM SCH MLS/HR: 100 INJECTION, SOLUTION INTRAVENOUS at 01:46

## 2018-12-29 RX ADMIN — AMPICILLIN SODIUM AND SULBACTAM SODIUM SCH MLS/HR: 2; 1 INJECTION, POWDER, FOR SOLUTION INTRAVENOUS at 12:02

## 2018-12-29 RX ADMIN — THIAMINE HYDROCHLORIDE PRN MLS/HR: 100 INJECTION, SOLUTION INTRAMUSCULAR; INTRAVENOUS at 15:01

## 2018-12-29 RX ADMIN — TOBRAMYCIN SCH DROP: 3 SOLUTION OPHTHALMIC at 02:35

## 2018-12-29 RX ADMIN — Medication SCH MEQ: at 09:01

## 2018-12-29 RX ADMIN — BACLOFEN SCH MG: 10 TABLET ORAL at 06:00

## 2018-12-29 RX ADMIN — BACLOFEN SCH MG: 10 TABLET ORAL at 01:53

## 2018-12-29 NOTE — MISCELLANEOUS PROVIDER NOTE
Miscellaneous Provider Note


Note


Clogged PEG tube swapped for new 20F balloon tip PEG tube without problems.  Pt 


tolerated the procedure without issues.











ULLRICH,JOHN A MD              Dec 29, 2018 08:22

## 2018-12-29 NOTE — MEDICAL NUTRITION THERAPY
Nutrition Anthropometrics


Weight (Pounds):  127


Weight (Calculated Kilograms):  57.606


Philip Nutrition Score:         Probably Inadequate 


Philip Nutrition Risk Score:  11


Dietary Referral


Nutrition Risk Factors:     Diff. Swallowing, Tube Feeding 


Nutrition Risk Comment:  tube feeding





Physical Findings


Physical Appearance:  Not able to calculate BMI


Skin Appearance


Skin Appearance:


Edema


Edema Location Modifier:  


Edema Location:               


Type of Edema:                 


Degree of Edema:


Gastrointestinal Symptoms


GI Symtoms:                 


Tube Present:              PEG 


Bowel Sounds:              


Recent Bowel Pattern:   Constipated 


Stool Characteristics:





Nutrition/Food History


Tube Feed Prior to Admit





Nutritional Diagnosis


Nutritional Risk Acuity 2:  Tube Feed Stable


Past Medical History:  


Hx of quadriplegia and traumatic brain injury.


Nutrition Diagnosis:  Increased Nutrient Needs


Nutrition Etiology:  Physiological Causes


Nutrition Problem/Etiology/Sym:  


Increased Nutrient Needs related to physiological causes increasing


nutrient needs, e.g., aspiration pneumonia AEB low albumin status of 3.4.


Energy Requirement:  2020 (Kg X 35 Kcal)


Protein Requirement:  70 (kg X 1.2)


Fluid Requirement:  2020


Diet Type:  Tube Feeding (TF)


Nutrition Intervention:  Cont diet as ordered





Nutritional Support


Current Enteral / Parental:  Tube Feeding


Tube Feeding Formulas:  Osmolite 1.5cal/ml-Hi Raymundo


Tube Feeding Supplement Streng:  Full


Feeding Route:  PEG


Rate:  60mL/hr


Current Duration:  24


Current Calories:  2160


Current Protein:  90


Total Current Calories:  2160





Nutrition Monitoring & Eval


RD Patient Assessment Time:  45 minutes


RD Assessment Type:  RD Assessment


Patient Nutrition Acuity:  2-Moderate


Follow Up Date:  Jan 1, 2019


Nutritional Comment:  


12/29/18  [facility] pt admitted for aspiration pneumonia and constipation.  Pt


is a 18-year-old traumatic brain injury quadriplegic, on PEG tube feeding


at [facility].  Alb 3.4, High AST/ALT/Alk Phos.  Not able to assess BMI d/t no


available height.  Receiving Osmolite 1.5 at 60mL/hr.  This provides 2160


Kcals, 1097mL free water and 90 grams of protein per 24 hours.  Tube


feeding meeting 107% of estimated Kcal needs and 128% of estimated protein


needs.   Follow labs, TF, etc.  -LOLA MOORE          Dec 29, 2018 16:39

## 2018-12-29 NOTE — HOSPITALIST PROGRESS NOTE
Subjective


Progress Notes


Subjective


No emesis. BM in ER, but none since admission. PEG tube plugged. He does get 


agitated with tactile stimulation.





Physical Exam





Vital Signs








  Date Time  Temp Pulse Resp B/P (MAP) Pulse Ox O2 Delivery O2 Flow Rate FiO2


 


12/29/18 08:59 98.5 81 16 106/59 (75) 97 Oxy Mask 1.0 














Intake and Output 


 


 12/29/18





 07:00


 


Intake Total 805 ml


 


Balance 805 ml


 


 


 


Intake IV Total 805 ml


 


# Bowel Movements 1








General Appearance:  Other (he does awaken with tactile stimuli/some agitation 


at times)


Neuro:  Other (chronic spastic quadriparesis)


ENT:  Oropharynx Clear


Neck:  No Masses


Cardiovascular:  Regular Rate and Rhythm


Respiratory:  Other (scattered rhonchi/coarse upper airway sounds)


Chest:  No Tenderness


GI:  Other (increased tone in abdominal musculature/PEG site looks good - but 


tube appears to have tube feeding in entire length)


Extremities:  Warm, Perfused


Result Diagram:  


12/29/18 0656                                                                   


            12/29/18 0656








Assessment and Plan


Problems:  


(1) Aspiration pneumonia


Status:  Acute


Assessment & Plan:  Chronic/recurrent. Currently on Unasyn 3g IV q 6hrs. Will 


continue supplemental O2 as needed to keep saturations 90% or greater. 





(2) Constipation


Status:  Chronic


Assessment & Plan:  The patient had a large BM in ER prior to transfer to the 


medical floor. Will resume tube feedings and continue bowel regimen (modify if 


needed).





(3) Elevated liver enzymes


Status:  Chronic


Assessment & Plan:  Slight improvements today. The patient has had elevated LFTs


intermittently when reviewing the EMR. Etiology unclear. Will monitor.





(4) Traumatic brain injury


Status:  Chronic


Assessment & Plan:  Stable.





(5) Quadriplegia


Status:  Chronic


Assessment & Plan:  Stable.





(6) Hypokalemia


Status:  Acute


Assessment & Plan:  Resolved with IV supplementation. Watch lab.





(7) Hypernatremia


Status:  Acute


Assessment & Plan:  Likely due to dehydration. Will continue gentle fluids IV 


and via PEG tube. Watch lab.





(8) Conjunctivitis


Status:  Acute


Assessment & Plan:  On Tobramycin ophthalmic drops. 








Exam


Sepsis Risk:  No Definite Risk





Problem Qualifiers





(1) Constipation:  


Constipation type:  other constipation type  Qualified Codes:  K59.09 - Other 


constipation








NESSA PADILLA MD            Dec 29, 2018 10:47

## 2018-12-30 ENCOUNTER — HOSPITAL ENCOUNTER (OUTPATIENT)
Dept: HOSPITAL 89 - AMB | Age: 18
End: 2018-12-30
Payer: MEDICAID

## 2018-12-30 VITALS — DIASTOLIC BLOOD PRESSURE: 64 MMHG | SYSTOLIC BLOOD PRESSURE: 114 MMHG

## 2018-12-30 VITALS — DIASTOLIC BLOOD PRESSURE: 62 MMHG | SYSTOLIC BLOOD PRESSURE: 103 MMHG

## 2018-12-30 VITALS — DIASTOLIC BLOOD PRESSURE: 52 MMHG | SYSTOLIC BLOOD PRESSURE: 102 MMHG

## 2018-12-30 DIAGNOSIS — Z76.89: Primary | ICD-10-CM

## 2018-12-30 LAB — PLATELET COUNT, AUTOMATED: 71 K/UL (ref 150–450)

## 2018-12-30 RX ADMIN — AMPICILLIN SODIUM AND SULBACTAM SODIUM SCH MLS/HR: 2; 1 INJECTION, POWDER, FOR SOLUTION INTRAVENOUS at 05:19

## 2018-12-30 RX ADMIN — LEVETIRACETAM SCH MLS/HR: 100 INJECTION, SOLUTION INTRAVENOUS at 09:06

## 2018-12-30 RX ADMIN — THIAMINE HYDROCHLORIDE PRN MLS/HR: 100 INJECTION, SOLUTION INTRAMUSCULAR; INTRAVENOUS at 02:28

## 2018-12-30 RX ADMIN — SERTRALINE HYDROCHLORIDE SCH MG: 50 TABLET, FILM COATED ORAL at 09:08

## 2018-12-30 RX ADMIN — TOBRAMYCIN SCH DROP: 3 SOLUTION OPHTHALMIC at 05:19

## 2018-12-30 RX ADMIN — AMPICILLIN SODIUM AND SULBACTAM SODIUM SCH MLS/HR: 2; 1 INJECTION, POWDER, FOR SOLUTION INTRAVENOUS at 00:25

## 2018-12-30 RX ADMIN — POLYETHYLENE GLYCOL 3350 SCH GM: 17 POWDER, FOR SOLUTION ORAL at 09:09

## 2018-12-30 RX ADMIN — TOBRAMYCIN SCH DROP: 3 SOLUTION OPHTHALMIC at 01:19

## 2018-12-30 RX ADMIN — BACLOFEN SCH MG: 10 TABLET ORAL at 05:19

## 2018-12-30 RX ADMIN — BACLOFEN SCH MG: 10 TABLET ORAL at 00:25

## 2018-12-30 RX ADMIN — TOBRAMYCIN SCH DROP: 3 SOLUTION OPHTHALMIC at 09:08

## 2018-12-30 RX ADMIN — Medication SCH MEQ: at 09:08

## 2018-12-30 NOTE — HOSPITALIST DEPART
Discharge Summary


Reason for Hosp/Final Diag:  


(1) Aspiration pneumonia


Status:  Acute


Hospital Course & Plan:  He does have chronic aspiration, and his chest x-ray 


looks similar to prior studies.  However, he did have a low grade fever.  We 


started him on Unasyn, and have now converted him to oral Augmentin.





(2) Constipation


Status:  Chronic


Hospital Course & Plan:  He did have good results with a Dulcolax suppository.





(3) Elevated liver enzymes


Status:  Chronic


Hospital Course & Plan:  This has been a chronic issue.





(4) Traumatic brain injury


Status:  Chronic


Hospital Course & Plan:  Stable.





(5) Quadriplegia


Status:  Chronic


Hospital Course & Plan:  Stable.





(6) Hypokalemia


Status:  Acute


Hospital Course & Plan:  Resolved with IV supplementation. 





(7) Hypernatremia


Status:  Acute


Hospital Course & Plan:  Likely due to dehydration. Free water has been 


administered through his PEG tube.





(8) Conjunctivitis


Status:  Acute


Hospital Course & Plan:  On Tobramycin ophthalmic drops. 





Departure


Latest Vital Signs





Vital Signs








 12/30/18





 07:29


 


Temp 98.5


 


Pulse 79


 


Resp 16


 


B/P (MAP) 102/52 (69)


 


Pulse Ox 95


 


O2 Delivery Oxy Mask


 


O2 Flow Rate 1.0








Weight (Pounds):  127


Weight (Ounces):  8.0


Result Diagram:  


12/30/18 0543                                                                   


            12/30/18 0543





Condition:  Improved


Discharge:  Home, Self Care


Discharge Code Status:  DNR, DNI





Discharge Instructions


Home Meds


Active Scripts


Amoxicillin/Pot Clav 875-125 Mg Tab (AUGMENTIN 875-125 TABLET) 1 Each Tablet, 1 


TAB FT Q12H, #10 TAB


   Prov:ANI LAURA DO         12/30/18


Reported Medications


Metoclopramide Hcl 10 Mg Vial (REGLAN 10 MG VIAL) 10 Mg/2 Ml Injs, 5 MG PO TID 


PRN for NAUSEA/PAIN, VIAL


   12/28/18


Levetiracetam (LEVETIRACETAM) 500 Mg/5 Ml Solution, 5 ML PO Q12H, ML


   9/30/18


Ondansetron (ZOFRAN ODT) 4 Mg Tab.rapdis, 4 MG PO Q8H PRN for NAUSEA, TAB.SOL


   8/30/18


Baclofen (BACLOFEN) 20 Mg Tablet, 20 MG FT Q6H, #15 TAB


   8/29/18


Polyethylene Glycol 3350 (MIRALAX) 17 Gm Powd.pack, 17 GM FT DAILY, PKT


   8/29/18


Bisacodyl (BISACODYL) 10 Mg Supp.rect, 10 MG RC PRN, SUPP.RECT


   8/29/18


Sertraline Hcl (SERTRALINE HCL) 25 Mg Tablet, 1 TAB FT QDAY, TAB


   7/29/18


Potassium Chloride (POTASSIUM CHLORIDE) 20 Meq Packet, 20 MEQ FT QDAY, PACKET


   7/29/18


Oxycodone/Acetaminophen (OXYCODONE/ACETAMINOPHEN 5MG/325 MG) 5 Mg/325 Mg Tab, 1 


TAB FT HS


   7/29/18


Clonazepam (KLONOPIN) 1 Mg Tablet, 1 MG FT BID, #7 TAB


   7/29/18


Glycopyrrolate (GLYCOPYRROLATE) 2 Mg Tablet, 2 MG FT TID


   7/29/18


Discontinued Reported Medications


Metoclopramide Hcl (METOCLOPRAMIDE HCL) 5 Mg/5 Ml Solution, 5 MG FT Q6H PRN for 


NAUSEA/VOMITING


   9/30/18


Eyelid Cleanser Combination #5 (OCUSOFT LID SCRUB) 1 Each Med..pad, 1 EACH TP 


BID


   7/29/18


Hyoscyamine Sulfate (LEVSIN) 0.125 Mg Tablet, 0.125 MG FT BID


   7/29/18


Discontinued Scripts


Amoxicillin/Potassium Clav (AMOX TR-K -125 MG TAB) 1 Each Tablet, 875 MG 


PO BIDBS, #8 TAB


   Prov:RHONDA SHIN TERESA FNP         10/1/18


Activity:  As Tolerated


Special Instructions:  


Tube feeds





Venous Thromboembolism


Antithrombotics


Is Pt On Any Antithrombotics?:  No





Problem Qualifiers





(1) Constipation:  


Constipation type:  other constipation type  Qualified Codes:  K59.09 - Other 


constipation








ANI LAURA DO                  Dec 30, 2018 08:55

## 2019-01-02 ENCOUNTER — HOSPITAL ENCOUNTER (OUTPATIENT)
Dept: HOSPITAL 89 - AMB | Age: 19
End: 2019-01-02
Payer: MEDICAID

## 2019-01-02 ENCOUNTER — HOSPITAL ENCOUNTER (EMERGENCY)
Dept: HOSPITAL 89 - ER | Age: 19
Discharge: HOME | End: 2019-01-02
Payer: MEDICAID

## 2019-01-02 VITALS — SYSTOLIC BLOOD PRESSURE: 94 MMHG | DIASTOLIC BLOOD PRESSURE: 77 MMHG

## 2019-01-02 DIAGNOSIS — G82.50: ICD-10-CM

## 2019-01-02 DIAGNOSIS — K92.0: Primary | ICD-10-CM

## 2019-01-02 DIAGNOSIS — G82.50: Primary | ICD-10-CM

## 2019-01-02 DIAGNOSIS — Z87.820: ICD-10-CM

## 2019-01-02 DIAGNOSIS — K59.00: Primary | ICD-10-CM

## 2019-01-02 LAB
INR PPP: 1
PLATELET COUNT, AUTOMATED: 84 K/UL (ref 150–450)

## 2019-01-02 PROCEDURE — 84075 ASSAY ALKALINE PHOSPHATASE: CPT

## 2019-01-02 PROCEDURE — 99284 EMERGENCY DEPT VISIT MOD MDM: CPT

## 2019-01-02 PROCEDURE — 96374 THER/PROPH/DIAG INJ IV PUSH: CPT

## 2019-01-02 PROCEDURE — 74177 CT ABD & PELVIS W/CONTRAST: CPT

## 2019-01-02 PROCEDURE — 71045 X-RAY EXAM CHEST 1 VIEW: CPT

## 2019-01-02 PROCEDURE — 85025 COMPLETE CBC W/AUTO DIFF WBC: CPT

## 2019-01-02 PROCEDURE — 86900 BLOOD TYPING SEROLOGIC ABO: CPT

## 2019-01-02 PROCEDURE — 82247 BILIRUBIN TOTAL: CPT

## 2019-01-02 PROCEDURE — 86850 RBC ANTIBODY SCREEN: CPT

## 2019-01-02 PROCEDURE — 83690 ASSAY OF LIPASE: CPT

## 2019-01-02 PROCEDURE — 84155 ASSAY OF PROTEIN SERUM: CPT

## 2019-01-02 PROCEDURE — 84460 ALANINE AMINO (ALT) (SGPT): CPT

## 2019-01-02 PROCEDURE — 82947 ASSAY GLUCOSE BLOOD QUANT: CPT

## 2019-01-02 PROCEDURE — 84450 TRANSFERASE (AST) (SGOT): CPT

## 2019-01-02 PROCEDURE — 82271 OCCULT BLOOD OTHER SOURCES: CPT

## 2019-01-02 PROCEDURE — 85730 THROMBOPLASTIN TIME PARTIAL: CPT

## 2019-01-02 PROCEDURE — 36415 COLL VENOUS BLD VENIPUNCTURE: CPT

## 2019-01-02 PROCEDURE — 82435 ASSAY OF BLOOD CHLORIDE: CPT

## 2019-01-02 PROCEDURE — 84295 ASSAY OF SERUM SODIUM: CPT

## 2019-01-02 PROCEDURE — 82040 ASSAY OF SERUM ALBUMIN: CPT

## 2019-01-02 PROCEDURE — 85610 PROTHROMBIN TIME: CPT

## 2019-01-02 PROCEDURE — 82310 ASSAY OF CALCIUM: CPT

## 2019-01-02 PROCEDURE — 82565 ASSAY OF CREATININE: CPT

## 2019-01-02 PROCEDURE — 81001 URINALYSIS AUTO W/SCOPE: CPT

## 2019-01-02 PROCEDURE — 96361 HYDRATE IV INFUSION ADD-ON: CPT

## 2019-01-02 PROCEDURE — 83986 ASSAY PH BODY FLUID NOS: CPT

## 2019-01-02 PROCEDURE — 84132 ASSAY OF SERUM POTASSIUM: CPT

## 2019-01-02 PROCEDURE — 82374 ASSAY BLOOD CARBON DIOXIDE: CPT

## 2019-01-02 PROCEDURE — 86901 BLOOD TYPING SEROLOGIC RH(D): CPT

## 2019-01-02 PROCEDURE — 84520 ASSAY OF UREA NITROGEN: CPT

## 2019-01-02 NOTE — RADIOLOGY IMAGING REPORT
FACILITY: [facility] - [place] 

 

PATIENT NAME: Zelalem Salinas

: 2000

MR: [id number]

V: [id number]

EXAM DATE: 

ORDERING PHYSICIAN: GELY ALEXIS

TECHNOLOGIST: 

 

Location: [facility] - [place]

Patient: Zelalem Salinas

: 2000

MRN: [id number]

Visit/Account:[id number]

Date of Sevice:  2019

 

ACCESSION #: [id number]

 

ABDOMEN/PELVIS WITH CONTRAST

 

HISTORY:  gi bleed

 

TECHNIQUE:  Axial images were obtained through the abdomen and pelvis with intravenous contrast . One
 of the following dose optimization techniques was utilized in the performance of this exam: automate
d exposure control; adjustment of the mA and/or kv according to patient size; or use of iterative rec
onstruction technique. Specific details can be referenced in the facility's radiology CT exam operati
onal policy.

 

CONTRAST:  75 mL of Isovue-370

 

COMPARISON:  CT abdomen/pelvis 2018

 

FINDINGS:

 

Visualized lung bases:  Improving opacities within the right lower lobe and base of the right middle 
lobe

Hepatobiliary:  Negative.

Spleen:  Negative.

Adrenals:  Negative.

Pancreas:  Negative.

Kidneys/ureters/bladder:  Negative.

Bowel/peritoneum/mesentery:  Reidentified is a large amount of stool especially within the rectum wit
h stable mild edema within the rectal wall.  The large amount of rectal stool displaces the urinary b
ladder to the left which contains a Johnson catheter.  Moderate stool throughout the remainder the colo
n.  No evidence of small bowel obstruction.  No free air or ascites.  Percutaneous gastrostomy tube a
nd nasogastric tube noted.

Vessels:  Negative.

Lymph nodes: Negative.

Pelvic genitourinary: Negative.

Bones/body wall:  Negative.

Other findings: None significant

 

IMPRESSION:

 

1.  Reidentified is a large amount of colonic stool especially within the rectum with mild edema with
in the rectal wall raising concern for stercoral colitis.

2.  No small bowel obstruction or free air.

3.  Nasogastric tube and percutaneous gastrostomy tube noted.

4.  Decreasing airspace disease within the right middle lobe and right lower lobe.

 

Results were called to GELY ALEXIS at 2019 9:14 AM.

 

Report Dictated By: Joel Camacho MD at 2019 9:03 AM

 

Report E-Signed By: Joel Camacho MD  at 2019 9:14 AM

 

WSN:[id number]
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
## 2019-01-02 NOTE — RADIOLOGY IMAGING REPORT
FACILITY: [facility] - [place] 

 

PATIENT NAME: Zelalem Salinas

: 2000

MR: [id number]

V: [id number]

EXAM DATE: 

ORDERING PHYSICIAN: GELY ALEXIS

TECHNOLOGIST: 

 

Location: [facility] [place]

Patient: Zelalem Salinas

: 2000

MRN: [id number]

Visit/Account:[id number]

Date of Sevice:  2019

 

ACCESSION #: [id number]

 

CHEST SINGLE AP

 

Indication: Post NG tube placement.

 

Comparison: 2018

 

Findings:

Interval placement of a nasogastric tube which is seen extending through the esophagus and looping wi
thin the proximal stomach.

Surgical clips noted along the left mediastinum.  Unremarkable appearance of median sternotomy wires.


Heart size within normal limits.

Subtle persistent haziness of the right lung, decreased since prior exam.  Left lung is unremarkable.


No pneumothorax or pleural effusion.

 

 

IMPRESSION:

1. NG tube terminating within the stomach.

2.  Subtle persistent haziness of the right lung, decreased since prior exam.

 

Report Dictated By: Kimo Chance MD at 2019 8:37 AM

 

Report E-Signed By: Kimo Chance MD  at 2019 8:39 AM

 

WSN:[id number]